# Patient Record
Sex: MALE | Race: WHITE | Employment: STUDENT | URBAN - METROPOLITAN AREA
[De-identification: names, ages, dates, MRNs, and addresses within clinical notes are randomized per-mention and may not be internally consistent; named-entity substitution may affect disease eponyms.]

---

## 2018-01-19 ENCOUNTER — TRANSFERRED RECORDS (OUTPATIENT)
Dept: HEALTH INFORMATION MANAGEMENT | Facility: CLINIC | Age: 33
End: 2018-01-19

## 2019-12-24 ENCOUNTER — HOSPITAL ENCOUNTER (EMERGENCY)
Facility: CLINIC | Age: 34
Discharge: HOME | End: 2019-12-24
Attending: EMERGENCY MEDICINE | Admitting: EMERGENCY MEDICINE
Payer: COMMERCIAL

## 2019-12-24 VITALS
TEMPERATURE: 98.1 F | DIASTOLIC BLOOD PRESSURE: 71 MMHG | OXYGEN SATURATION: 100 % | BODY MASS INDEX: 29.05 KG/M2 | SYSTOLIC BLOOD PRESSURE: 115 MMHG | HEART RATE: 84 BPM | WEIGHT: 180 LBS | RESPIRATION RATE: 16 BRPM

## 2019-12-24 DIAGNOSIS — J06.9 VIRAL UPPER RESPIRATORY INFECTION: ICD-10-CM

## 2019-12-24 DIAGNOSIS — R05.9 COUGH: ICD-10-CM

## 2019-12-24 LAB
DEPRECATED S PYO AG THROAT QL EIA: NORMAL
FLUAV+FLUBV AG SPEC QL: NEGATIVE
FLUAV+FLUBV AG SPEC QL: NEGATIVE
SPECIMEN SOURCE: NORMAL
SPECIMEN SOURCE: NORMAL

## 2019-12-24 PROCEDURE — 99283 EMERGENCY DEPT VISIT LOW MDM: CPT | Performed by: EMERGENCY MEDICINE

## 2019-12-24 PROCEDURE — 87081 CULTURE SCREEN ONLY: CPT | Performed by: EMERGENCY MEDICINE

## 2019-12-24 PROCEDURE — 87880 STREP A ASSAY W/OPTIC: CPT | Performed by: EMERGENCY MEDICINE

## 2019-12-24 PROCEDURE — 99282 EMERGENCY DEPT VISIT SF MDM: CPT | Mod: Z6 | Performed by: EMERGENCY MEDICINE

## 2019-12-24 PROCEDURE — 87804 INFLUENZA ASSAY W/OPTIC: CPT | Performed by: EMERGENCY MEDICINE

## 2019-12-24 ASSESSMENT — ENCOUNTER SYMPTOMS
SINUS PRESSURE: 0
BLOOD IN STOOL: 0
RHINORRHEA: 1
FEVER: 0
PSYCHIATRIC NEGATIVE: 1
SHORTNESS OF BREATH: 0
ABDOMINAL PAIN: 0
DIZZINESS: 0
FATIGUE: 0
ACTIVITY CHANGE: 0
DYSURIA: 0
ARTHRALGIAS: 0
CONSTIPATION: 0
WOUND: 0
HEMATURIA: 0
SINUS PAIN: 0
CHILLS: 0
DIARRHEA: 0
BACK PAIN: 0
PALPITATIONS: 0
NECK STIFFNESS: 0
NAUSEA: 0
APPETITE CHANGE: 0
SORE THROAT: 0
ANAL BLEEDING: 0
LIGHT-HEADEDNESS: 1
VOMITING: 0
DIAPHORESIS: 0
CHEST TIGHTNESS: 0
EYE REDNESS: 0
MYALGIAS: 0
STRIDOR: 0
ABDOMINAL DISTENTION: 0
FREQUENCY: 0
EYE PAIN: 0

## 2019-12-24 NOTE — DISCHARGE INSTRUCTIONS
Thank you for choosing [facility].     Please closely monitor for further symptoms. Return to the Emergency Department if you develop any new or worsening signs or symptoms.    Your rapid influenza A/B test was negative. Your rapid Strep test for strep throat was negative.    You most likely have an upper respiratory infection caused by a virus. You do not need antibiotics at this time. Please see your primary care physician of cough does not get better in 7-10 days.     You may take Cepacol throat lozenges for sore throat related to cough, dextromethorphan for cough, and Mucinex or other over the counter cold medications for symptoms.     Labs, cultures or final xray interpretations may still need to be reviewed.  We will call you if your plan of care needs to be changed.    Please follow up with your primary care physician or clinic.

## 2019-12-24 NOTE — ED PROVIDER NOTES
History     Chief Complaint   Patient presents with     Cough     sore throat and cough for past 5 days, symptoms are getting worse, notes green phlegm. Taking actidil with minimal relief.     HPI  Anuj Tam is a 34 year old male with no significant PMH who presents with 5 days of cough and congestion. He reports he had a cold a few weeks ago and went to [facility], received Aprodine, which completely resolved his symptoms.  Starting on Thursday (5 days ago), patient noted cough and runny nose. Symptoms have been persistent and becoming more frequent. Patient notes he spent time with someone on Thursday who had congestion.  Cough occasionally brings up greenish phlegm. No headache, no fever/chills, no shortness of breath, no vomiting, no diarrhea, no urinary frequency/dysuria/hematuria, no hematochezia, no myalgias/arthralgias. Is eating and drinking normally. Notes some sort throat associated with coughing. No difficulty swallowing.    Did not receive influenza vaccine this year.    Patient states he is otherwise healthy. Current PhD student in economics. Only medication is sertraline.     I have reviewed the Medications, Allergies, Past Medical and Surgical History, and Social History in the Epic system.    Review of Systems   Constitutional: Negative for activity change, appetite change, chills, diaphoresis, fatigue and fever.   HENT: Positive for congestion, postnasal drip and rhinorrhea. Negative for ear discharge, hearing loss, mouth sores, nosebleeds, sinus pressure, sinus pain and sore throat.    Eyes: Negative for pain and redness.   Respiratory: Negative for chest tightness, shortness of breath and stridor.    Cardiovascular: Negative for chest pain, palpitations and leg swelling.   Gastrointestinal: Negative for abdominal distention, abdominal pain, anal bleeding, blood in stool, constipation, diarrhea, nausea and vomiting.   Genitourinary: Negative for dysuria, frequency and hematuria.    Musculoskeletal: Negative for arthralgias, back pain, myalgias and neck stiffness.   Skin: Negative for rash and wound.   Neurological: Positive for light-headedness. Negative for dizziness and syncope.   Psychiatric/Behavioral: Negative.        Physical Exam   BP: 115/71  Pulse: 84  Temp: 98.1  F (36.7  C)  Resp: 16  Weight: 81.6 kg (180 lb)  SpO2: 100 %      Physical Exam  Constitutional:       Appearance: Normal appearance.   HENT:      Head: Atraumatic.      Right Ear: Tympanic membrane, ear canal and external ear normal.      Left Ear: Tympanic membrane, ear canal and external ear normal.      Nose: Congestion and rhinorrhea present.      Mouth/Throat:      Mouth: Mucous membranes are moist.      Pharynx: Oropharynx is clear. No oropharyngeal exudate or posterior oropharyngeal erythema.   Eyes:      Conjunctiva/sclera: Conjunctivae normal.      Pupils: Pupils are equal, round, and reactive to light.   Neck:      Musculoskeletal: Normal range of motion and neck supple.   Cardiovascular:      Rate and Rhythm: Normal rate and regular rhythm.      Pulses: Normal pulses.   Pulmonary:      Effort: Pulmonary effort is normal. No respiratory distress.      Breath sounds: No stridor. No wheezing, rhonchi or rales.   Abdominal:      General: Abdomen is flat. Bowel sounds are normal. There is no distension.      Palpations: Abdomen is soft.      Tenderness: There is no guarding or rebound.   Musculoskeletal: Normal range of motion.   Skin:     General: Skin is warm and dry.      Coloration: Skin is not jaundiced or pale.      Findings: No rash.   Neurological:      General: No focal deficit present.      Mental Status: He is alert.   Psychiatric:         Mood and Affect: Mood normal.         ED Course         Results for orders placed or performed during the hospital encounter of 12/24/19   Rapid strep screen     Status: None   Result Value Ref Range    Specimen Description Throat     Rapid Strep A Screen        NEGATIVE: No Group A streptococcal antigen detected by immunoassay, await culture report.   Influenza A/B antigen     Status: None   Result Value Ref Range    Influenza A/B Agn Specimen Nasopharyngeal     Influenza A Negative NEG^Negative    Influenza B Negative NEG^Negative            Assessments & Plan (with Medical Decision Making)   Yonatan Leslie is a healthy 34 year old presenting with 5 days of congestion and productive cough. He has no other symptoms. He had a sick contact with the same symptoms prior to developing them. On exam he appears well, with normal vitals. Pulmonary exam notable for no wheezing, rales or signs of consolidation, and patient satting 100% on room air. Low suspicion for influenza given no fever or myalgias and rapid strep A/B swab is negative. Low suspicion for strep throat given no sore throat and presence of cough and rapid strep swab is negative. Likely has viral upper respiratory infection given symptoms and time course. There is no indication for antibiotics at this time. Patient instructed to seek medical care or follow up with primary doctor if cough does not improve in the next 7-10 days or if he develops fever, severe headache, sinus pressure that does not go away. Patient discharged to home with instructions to take over the counter cold medications.     I have reviewed the nursing notes.    I have reviewed the findings, diagnosis, plan and need for follow up with the patient.    New Prescriptions    No medications on file       Final diagnoses:   Viral upper respiratory infection   Cough       12/24/2019   [facility], [place], EMERGENCY DEPARTMENT     Shelby Freed MD  Resident  12/24/19 8458

## 2019-12-26 LAB
BACTERIA SPEC CULT: NORMAL
Lab: NORMAL
SPECIMEN SOURCE: NORMAL

## 2019-12-26 NOTE — RESULT ENCOUNTER NOTE
Final Beta strep group A r/o culture is NEGATIVE for Group A streptococcus.    No treatment or change in treatment per [facility] Strep protocol.

## 2019-12-26 NOTE — ED PROVIDER NOTES
This data collected with the Resident working in the Emergency Department.  Patient was seen and evaluated by myself and I repeated the history and physical exam with the patient.  The plan of care was discussed with them.  The key portions of the note including the entire assessment and plan reflect my documentation.           Edgar Pace, DO  12/26/19 1600

## 2020-03-10 ENCOUNTER — HEALTH MAINTENANCE LETTER (OUTPATIENT)
Age: 35
End: 2020-03-10

## 2020-07-07 ENCOUNTER — TRANSFERRED RECORDS (OUTPATIENT)
Dept: HEALTH INFORMATION MANAGEMENT | Facility: CLINIC | Age: 35
End: 2020-07-07

## 2020-07-07 ENCOUNTER — MEDICAL CORRESPONDENCE (OUTPATIENT)
Dept: HEALTH INFORMATION MANAGEMENT | Facility: CLINIC | Age: 35
End: 2020-07-07

## 2020-07-22 VITALS — WEIGHT: 168 LBS | BODY MASS INDEX: 27 KG/M2 | HEIGHT: 66 IN

## 2020-07-22 RX ORDER — SERTRALINE HYDROCHLORIDE 100 MG/1
25 TABLET, FILM COATED ORAL DAILY
COMMUNITY

## 2020-07-22 RX ORDER — FAMOTIDINE 20 MG
TABLET ORAL
COMMUNITY

## 2020-07-22 ASSESSMENT — MIFFLIN-ST. JEOR: SCORE: 1649.79

## 2020-07-22 NOTE — PROGRESS NOTES
Referral Information     Referral #  Creation Date  Referral Status  Status Update     [id number]  07/15/2020  Authorized  07/15/2020: Status History    Status Reason  Referral Type  Referral Reasons  Referral Class    No Approval Necessary - Patient Tracking  none  none  none    To Specialty  To Provider  To Location/Place of Service  To Department    Nurse Practitioner  Shelli Kraus APRN CNP  [facility]    To Vendor  Referred By  By Location/Place of Service  By Department    none  none  none  none    Priority  Start Date  Expiration Date  Referral Entered By    Routine  07/23/2020 07/23/2021  Janneth Chavez    Visits Requested  Visits Authorized  Visits Completed  Visits Scheduled    1  1   1    Procedure Information     Free Text Procedure Description     NEW REFERRED OUTSIDE [facility]             Diagnosis Information     Diagnosis Description    ref by Dr. Velazquez from [facility]-- advised pt of vitrual visit - send sleep packet    Referral Notes   Number of Notes: 1   Type  Date  User  Summary  Attachment    FSC  07/15/2020  8:55 AM  Janneth Chavez  NEW REFERRED OUTSIDE [facility]  -    Note     PB DOS:                                 7/23/2020  Payor:                                     [employer] [place]     FSC Clearance Note  Visit Description:                  NEW REFERRED OUTSIDE [facility]     Benefits Contact:                  JOAQUIN-Verified  Benefits Phone:                      N/A  Reference Number:                N/A     Assigned Clinic:                      No assigned [facility]  Referral Number:                    N/A  Referral Date Range:              N/A  Number of Visits:                    N/A     Procedures  Actigraphy:                              Non-Covered     Oximetry:                                 Non-Covered     HST/PSG:                               Auth Required  MSLT/MWT:                            Auth Required   WatchPat:    "                            Auth Required                     .  Anuj Tam is a 34 year old male who is being evaluated via a billable video visit.      The patient has been notified of following:     \"This video visit will be conducted via a call between you and your physician/provider. We have found that certain health care needs can be provided without the need for an in-person physical exam.  This service lets us provide the care you need with a video conversation.  If a prescription is necessary we can send it directly to your pharmacy.  If lab work is needed we can place an order for that and you can then stop by our lab to have the test done at a later time.    Video visits are billed at different rates depending on your insurance coverage.  Please reach out to your insurance provider with any questions.    If during the course of the call the physician/provider feels a video visit is not appropriate, you will not be charged for this service.\"    Patient has given verbal consent for Video visit? Yes  How would you like to obtain your AVS? MyChart  If you are dropped from the video visit, the video invite should be resent to: Text to cell phone: see demographics  Will anyone else be joining your video visit? No        Video-Visit Details    Type of service:  Video Visit    Video Start Time: 9:05 AM  Video End Time: ~10AM    Originating Location (pt. Location): Home    Distant Location (provider location):  [facility] [place]     Platform used for Video Visit: [facility]   Outpatient Sleep Medicine Consultation  7/23/2020        Name: Anuj Tam MRN# [id number]   Age:  34 YOB: 1985      Date of Consultation: 7/7/2020  Consultation is requested by: KAITLIN Lord         Reason for Sleep Consult:      Snoring, annoying bed partner, less restorative sleep when not in COVID times with earlier deadlines and more anxiety with sleep          Assessment and " Plan:    Christy reports that this year he has had a history of difficulty with worsening of snoring, and less restorative sleep prior to COVID with some worry and anxiety about deadlines with arousals from sleep.  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  And the following diagnosis and plan has been developed...    Summary Sleep Diagnoses:      Snoring : Loud, gasping arousals    Insomnia : Possible insomnia with worry occurring during times of sleep fragmentation due to snoring and apnea.  Impact of circadian rhythm as well is likely playing a part as well as sleep habits of using devices (has changed the light exposure on 1 of his devices) and doing his study work within the 1 hour prior to bedtime  Sleep habits: Alcohol prior to bedtime is likely adding to sleep fragmentation, worry and to do list behaviors are present with wake ups.  Wake ups fortunately are brief  Summary Recommendations:       HST with stop bang score of 4/8    Treatment therapies and final decision through my chart communication with virtual visit after results of testing.     Summary Counseling:  See instructions     Counseling included a comprehensive review of pathophysiology of possible obstructive sleep apnea, and delayed sleep phase with variance and rise time limiting total sleep time, impact of  alcohol and less downtime prior to lights out (and possible device light) likely impacting quality of nights rest.  Diagnostic and therapeutic strategies as well as risks of inadequate therapy or no treatment were discussed.      Cessation of sleep habits alcohol before bedtime, wind down time, use of bed for his PhD work, and possible light from some devices were brought up.  He does have the light changed on 1 of his devices to a black background   Anuj was provided with educational materials in instructions which were reviewed with this visit.                History of Present Illness:    referred with c/o's of loud snoring and gasping arousals several times/night    Phyllis reports loud snoring, gasping or snorting arousals (several times per night) and on occasion needing to sleep separately from bed partner    (snoing, , gasping-sort or snort, waking up with panic or inc in HR, sleeping separately, )      SLEEP COMPLAINTS: DENIES THE FOLLOWING: (unless indicated in RED):        Signs/symptoms:    Morning headaches or confusion--  GERD or aspiration:+  Pain -  Bruxism: +  Parasomnia:  sw  -: confusional arousals  - no act  Narcolepsy:  Cataplexy  -, sleep paralysis - , hallucinations  -  RLS: : -      SLEEP-WAKE SCHEDULE:   Enters bed on [id number], exits bed on weekdays 8-8:30; enters bed at weekends 12-1:30 2A, exits bed on weekends: 9:30-10A  15-30-40 minsSLEEP LATENCY   And 0- one awakening for bathroom with < 5mins to return to sleep  Naps-0-  Sleep schedule is irregular   Work schedule is later in day, anxious   Estimated total sleep time 7-8A  Activities in bed:  Tablet, or computer, phone  Needs 8 hrs, tired     Denies the following unless in RED:   Behaviors in bed:  clock watching - to do list +, worry about health or ability to complete next day's-weeks tasks +  Housework - employment -anxiety or rumination,+ before sleep, unable to estimate number of days/7waking up with heart pounding or  racing-               Cardio-respiratory    Coexisting Lung disease:  -,  infrequent       Coexisting Heart disease:  -         Social, personal, family History and use of sleep aids or substances that affect sleep:     yovanny lives with wife. He is a student phd study. Sleep is disrupted by environmental factors from noise,-pets,-, children-, bedpartner+  Has bed partner been sleeping separately because of snoring or:  , Yes, and also does ask him to change position with his snoring    Sleep Family Hx:         Snoring + mom        RLS- -   CAMILO - +  Insomnia -   Parasomnia -   Chemical History:      Tobacco: cigar     Uses 1 cups/day of coffee occasional in afternoon. Last caffeine intake is usually before  7pm    Supplements for wakefulness: -    EtOH:  Some Weeks 1 or 2 after 6 pm, could be later on weekend: 12:30 AM Wine    Recreational Drugs: - mj   Sleep aids: yes: took valarian root, melatonin  (drowsy in am)          Review of Systems:     A complete 10 point review of systems was negative other than HPI or as commented below:   Harinder has gained 5-10 pounds 3 years.      CONSTITUTIONAL: NEGATIVE for weight gain/loss, fever, chills, sweats or night sweats, drug allergies.  EYES: NEGATIVE for changes in vision, blind spots, double vision.  ENT: NEGATIVE for ear pain, sore throat, sinus pain, post-nasal drip, runny nose, bloody nose  CARDIAC: NEGATIVE for fast heartbeats or fluttering in chest, chest pain or pressure, breathlessness when lying flat, swollen legs or swollen feet-with walking alot  NEUROLOGIC: NEGATIVE headaches, weakness or numbness in the arms or legs.  DERMATOLOGIC: NEGATIVE for rashes, new moles or change in mole(s)  PULMONARY: NEGATIVE SOB at rest, SOB with activity ( 2fights w/o stopping), dry cough, productive cough, coughing up blood, wheezing or whistling when breathing.    GASTROINTESTINAL: NEGATIVE for nausea or vomitting, loose or watery stools, fat or grease in stools,  "constipation, abdominal pain, bowel movements black in color or blood noted.  GENITOURINARY: NEGATIVE for pain during urination, blood in urine, urinating more frequently than usual, irregular menstrual periods.  MUSCULOSKELETAL: NEGATIVE for muscle pain, bone or joint pain, swollen joints.  ENDOCRINE: NEGATIVE for increased thirst or urination, diabetes.  MH: depression/anxiety started sertrolyine            Physical Examination:     Allergies:    No Known Allergies    Medications:    Current Outpatient Medications   Medication Sig Dispense Refill     B Complex Vitamins (VITAMIN B COMPLEX PO)        finasteride (PROPECIA) 1 MG tablet Take 1 mg by mouth daily       sertraline (ZOLOFT) 100 MG tablet Take 100 mg by mouth daily       Vitamin D, Cholecalciferol, 25 MCG (1000 UT) CAPS          Problem List:  Patient Active Problem List    Diagnosis Date Noted     Dyspnea and respiratory abnormality 06/18/2015     Priority: Medium     Problem list name updated by automated process. 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  No past surgical history on file.      Physical Examination:  Vitals: Ht 1.676 m (5' 6\")   Wt 76.2 kg (168 lb)   BMI 27.12 kg/m    BMI= Body mass index is 27.12 kg/m .     [place] Total Score 7/22/2020   Total score - [place] 3     ELZBIETA scale 20/28    Neck Circumference: - inches/cm   Constitutional: . Awake, alert, cooperative, dressed casually, good eye contact, comfortably sitting in a chair, in no apparent distress  Mood: euthymic; affect congruent with full range and intensity.  Attention/Concentration:  Normal   Eyes: No icterus.  ENT: Mallampati Class: IV.   Tonsillar Stage: 1  hidden by pillars   crowded oropharynx,  low-lying soft palate  deviated nasal septum,  Dentition: good condition, one molar implant, good advancement of lower jaw without tmd  Pulmonary: Normal respiration " rate at rest   Extremities: Upper extremities without clubbing bilaterally .  Skin: Facial: No rash or significant lesions.   Neurologic: Alert, oriented x3, no focal neurological deficit,                   A

## 2020-07-22 NOTE — PROGRESS NOTES
"Anuj Tam is a 34 year old male who is being evaluated via a billable video visit.      The patient has been notified of following:     \"This video visit will be conducted via a call between you and your physician/provider. We have found that certain health care needs can be provided without the need for an in-person physical exam.  This service lets us provide the care you need with a video conversation.  If a prescription is necessary we can send it directly to your pharmacy.  If lab work is needed we can place an order for that and you can then stop by our lab to have the test done at a later time.    Video visits are billed at different rates depending on your insurance coverage.  Please reach out to your insurance provider with any questions.    If during the course of the call the physician/provider feels a video visit is not appropriate, you will not be charged for this service.\"    Patient has given verbal consent for Video visit? Yes  How would you like to obtain your AVS? MyChart  If you are dropped from the video visit, the video invite should be resent to: Send to e-mail at: [email or web address]  Will anyone else be joining your video visit? No  {If patient encounters technical issues they should call [phone] :[id number]}      Video-Visit Details    Type of service:  Video Visit    Video Start Time: {video visit start/end time:152948}  Video End Time: {video visit start/end time:152948}    Originating Location (pt. Location): {video visit patient location:467096::\"Home\"}    Distant Location (provider location):  [facility]     Platform used for Video Visit: {Virtual Visit Platforms:630276::\"Instant Opinion\"}    {signature options:344368}        "

## 2020-07-22 NOTE — PATIENT INSTRUCTIONS
"MY TREATMENT INFORMATION FOR SLEEP APNEA-  Anuj Tam    DOCTOR : [facility]  SLEEP CENTER :  [place]    MY CONTACT NUMBER: [phone]    It was great to meet you in clinic today.  Your sleep problem is somewhat complicated by possible sleep apnea, but is also impacted by your natural \"night owl\" pattern somewhat limited limiting her total sleep time and some habits that could improve your sleep quality.  It is my understanding that you wish to move forward with a sleep study.  As you do know with COVID-19 we are out a period of time in scheduling.  In the meantime,  I will give you some information regarding how to change some habits around sleep to improve sleep quality.  Please look to the bottom of this letter to see some of the information that likely is impacting your sleep quality at this time.  I am also going to recommend that you consider using a positional device (or developing one on your own) to improve your quality of sleep up until 1 more week before we would study you.  This must be stopped likely at least 1 week prior so we can get a good baseline we may do do your study.    Am I having a sleep study at a sleep center?  Make sure you have an appointment for the study before you leave!    Am I having a home sleep study?  Watch this video:  /drop off device-   [email or web address]  We will be calling you in about 2 weeks time to schedule this study.    Please verify your insurance coverage with your insurance carrier    Frequently asked questions: About sleep apnea  1. What is Obstructive Sleep Apnea (CAMILO)? CAMILO is the most common type of sleep apnea. Apnea means, \"without breath.\"  Apnea is most often caused by narrowing or collapse of the upper airway as muscles relax during sleep.   Almost everyone has occasional apneas. Most people with sleep apnea have had brief interruptions at night frequently for many years.  The severity of sleep apnea " is related to how frequent and severe the events are.   2. What are the consequences of CAMILO? Symptoms include: feeling sleepy during the day, snoring loudly, gasping or stopping of breathing, trouble sleeping, and occasionally morning headaches or heartburn at night.  Sleepiness can be serious and even increase the risk of falling asleep while driving. Other health consequences may include development of high blood pressure and other cardiovascular disease in persons who are susceptible. Untreated CAMILO  can contribute to heart disease, stroke and diabetes.   3. What are the treatment options? In most situations, sleep apnea is a lifelong disease that must be managed with daily therapy. Medications are not effective for sleep apnea and surgery is generally not considered until other therapies have been tried. Your treatment is your choice . Continuous Positive Airway (CPAP) works right away and is the therapy that is effective in nearly everyone. An oral device to hold your jaw forward is usually the next most reliable option. Other options include postioning devices (to keep you off your back), weight loss, and surgery including a tongue pacing device. There is more detail about some of these options below.    Important tips for using CPAP and similar devices   Know your equipment:  CPAP is continuous positive airway pressure that prevents obstructive sleep apnea by keeping the throat from collapsing while you are sleeping. In most cases, the device is  smart  and can slowly self-adjusts if your throat collapses and keeps a record every day of how well you are treated-this information is available to you and your care team.  BPAP is bilevel positive airway pressure that keeps your throat open and also assists each breath with a pressure boost to maintain adequate breathing.  Special kinds of BPAP are used in patients who have inadequate breathing from lung or heart disease. In most cases, the device is  smart  and  can slowly self-adjusts to assist breathing. Like CPAP, the device keeps a record of how well you are treated.  Your mask is your connection to the device. You get to choose what feels most comfortable and the staff will help to make sure if fits. Here: are some examples of the different masks that are available:       Key points to remember on your journey with sleep apnea:  1. Sleep study.  PAP devices often need to be adjusted during a sleep study to show that they are effective and adjusted right.  2. Good tips to remember: Try wearing just the mask during a quiet time during the day so your body adapts to wearing it. A humidifier is recommended for comfort in most cases to prevent drying of your nose and throat. Allergy medication from your provider may help you if you are having nasal congestion.  3. Getting settled-in. It takes more than one night for most of us to get used to wearing a mask. Try wearing just the mask during a quiet time during the day so your body adapts to wearing it. A humidifier is recommended for comfort in most cases. Our team will work with you carefully on the first day and will be in contact within 4 days and again at 2 and 4 weeks for advice and remote device adjustments. Your therapy is evaluated by the device each day.   4. Use it every night. The more you are able to sleep naturally for 7-8 hours, the more likely you will have good sleep and to prevent health risks or symptoms from sleep apnea. Even if you use it 4 hours it helps. Occasionally all of us are unable to use a medical therapy, in sleep apnea, it is not dangerous to miss one night.   5. Communicate. Call our skilled team on the number provided on the first day if your visit for problems that make it difficult to wear the device. Over 2 out of 3 patients can learn to wear the device long-term with help from our team. Remember to call our team or your sleep providers if you are unable to wear the device as we may have  other solutions for those who cannot adapt to mask CPAP therapy. It is recommended that you sleep your sleep provider within the first 3 months and yearly after that if you are not having problems.   6. Use it for your health. We encourage use of CPAP masks during daytime quiet periods to allow your face and brain to adapt to the sensation of CPAP so that it will be a more natural sensation to awaken to at night or during naps. This can be very useful during the first few weeks or months of adapting to CPAP though it does not help medically to wear CPAP during wakefulness and  should not be used as a strategy just to meet guidelines.  7. Take care of your equipment. Make sure you clean your mask and tubing using directions every day and that your filter and mask are replaced as recommended or if they are not working.     BESIDES CPAP, WHAT OTHER THERAPIES ARE THERE?    Positioning Device-I am recommending that you consider using this to see if your sleep improves prior to your sleep study.  You must stop using this for 1 week prior to your study.  Positioning devices are generally used when sleep apnea is mild and only occurs on your back.This example shows a pillow that straps around the waist. It may be appropriate for those whose sleep study shows milder sleep apnea that occurs primarily when lying flat on one's back. Preliminary studies have shown benefit but effectiveness at home may need to be verified by a home sleep test. These devices are generally not covered by medical insurance.  Examples of devices that maintain sleeping on the back to prevent snoring and mild sleep apnea.  (You may want to consider something less expensive to see if this will help train you to roll from left side to right over the belly rather than over the back.  If people roll from side to side over the back, they often will stop on the back of sleep prior period off time with loud snoring and apnea)         LESS EXPENSIVE:  [employer]   or [employer] for slumber bump pillow, or make your own with backpack w/ chest strap stuffed w/ pillow;  or t shirt with /2 pockets, sew in tennis balls    Belt type body positioner  [email or web address]    Electronic reminder  Http://nightshifttherapy.[email or web address]  [email or web address]      Oral Appliance  What is oral appliance therapy?  An oral appliance device fits on your teeth at night like a retainer used after having braces. The device is made by a specialized dentist and requires several visits over 1-2 months before a manufactured device is made to fit your teeth and is adjusted to prevent your sleep apnea. Once an oral device is working properly, snoring should be improved. A home sleep test may be recommended at that time if to determine whether the sleep apnea is adequately treated.       Some things to remember:  -Oral devices are often, but not always, covered by your medical insurance. Be sure to check with your insurance provider.   -If you are referred for oral therapy, you will be given a list of specialized dentists to consider or you may choose to visit the Web site of the American Academy of Dental Sleep Medicine  -Oral devices are less likely to work if you have severe sleep apnea or are extremely overweight.     More detailed information  An oral appliance is a small acrylic device that fits over the upper and lower teeth  (similar to a retainer or a mouth guard). This device slightly moves jaw forward, which moves the base of the tongue forward, opens the airway, improves breathing for effective treat snoring and obstructive sleep apnea in perhaps 7 out of 10 people .  The best working devices are custom-made by a dental device  after a mold is made of the teeth 1, 2, 3.  When is an oral appliance indicated?  Oral appliance therapy is recommended as a first-line treatment for patients with primary snoring, mild sleep apnea, and for patients with moderate sleep apnea who prefer appliance  therapy to use of CPAP4, 5. Severity of sleep apnea is determined by sleep testing and is based on the number of respiratory events per hour of sleep.   How successful is oral appliance therapy?  The success rate of oral appliance therapy in patients with mild sleep apnea is 75-80% while in patients with moderate sleep apnea it is 50-70%. The chance of success in patients with severe sleep apnea is 40-50%. The research also shows that oral appliances have a beneficial effect on the cardiovascular health of CAMILO patients at the same magnitude as CPAP therapy7.  Oral appliances should be a second-line treatment in cases of severe sleep apnea, but if not completely successful then a combination therapy utilizing CPAP plus oral appliance therapy may be effective. Oral appliances tend to be effective in a broad range of patients although studies show that the patients who have the highest success are females, younger patients, those with milder disease, and less severe obesity. 3, 6.   Finding a dentist that practices dental sleep medicine  Specific training is available through the American Academy of Dental Sleep Medicine for dentists interested in working in the field of sleep. To find a dentist who is educated in the field of sleep and the use of oral appliances, near you, visit the Web site of the American Academy of Dental Sleep Medicine.    References  1. Mino et al. Objectively measured vs self-reported compliance during oral appliance therapy for sleep-disordered breathing. Chest 2013; 144(5): 2472-8825.  2. Margarita et al. Objective measurement of compliance during oral appliance therapy for sleep-disordered breathing. Thorax 2013; 68(1): 91-96.  3. Nicko et al. Mandibular advancement devices in 620 men and women with CAMILO and snoring: tolerability and predictors of treatment success. Chest 2004; 125: 9156-6593.  4. Lino, et al. Oral appliances for snoring and CAMILO: a review. Sleep 2006; 29:  440-493.  5. jt Marquez al. Oral appliance treatment for CAMILO: an update. J Clin Sleep Med 2014; 10(2): 215-227.  6. Valencia et al. Predictors of OSAH treatment outcome. J Dent Res 2007; 86: 4251-5009.      Weight Loss: (This is likely not playing a big impact in your case)    Weight loss is a long-term strategy that may improve sleep apnea in some patients.    Weight management is a personal decision and the decision should be based on your interest and the potential benefits.  If you are interested in exploring weight loss strategies, the following discussion covers the impact on weight loss on sleep apnea and the approaches that may be successful.    Being overweight does not necessarily mean you will have health consequences.  Those who have BMI over 35 or over 27 with existing medical conditions carries greater risk.   Weight loss decreases severity of sleep apnea in most people with obesity. For those with mild obesity who have developed snoring with weight gain, even 15-30 pound weight loss can improve and occasionally eliminate sleep apnea.  Structured and life-long dietary and health habits are necessary to lose weight and keep healthier weight levels.     Though there may be significant health benefits from weight loss, long-term weight loss is very difficult to achieve- studies show success with dietary management in less than 10% of people. In addition, substantial weight loss may require years of dietary control and may be difficult if patients have severe obesity. In these cases, surgical management may be considered.  Finally, older individuals who have tolerated obesity without health complications may be less likely to benefit from weight loss strategies.      [unfilled]    Surgery: (Less effective)    Surgery for obstructive sleep apnea is considered generally only when other therapies fail to work. Surgery may be discussed with you if you are having a difficult time tolerating CPAP and or when  there is an abnormal structure that requires surgical correction.  Nose and throat surgeries often enlarge the airway to prevent collapse.  Most of these surgeries create pain for 1-2 weeks and up to half of the most common surgeries are not effective throughout life.  You should carefully discuss the benefits and drawbacks to surgery with your sleep provider and surgeon to determine if it is the best solution for you.   More information  Surgery for CAMILO is directed at areas that are responsible for narrowing or complete obstruction of the airway during sleep.  There are a wide range of procedures available to enlarge and/or stabilize the airway to prevent blockage of breathing in the three major areas where it can occur: the palate, tongue, and nasal regions.  Successful surgical treatment depends on the accurate identification of the factors responsible for obstructive sleep apnea in each person.  A personalized approach is required because there is no single treatment that works well for everyone.  Because of anatomic variation, consultation with an examination by a sleep surgeon is a critical first step in determining what surgical options are best for each patient.  In some cases, examination during sedation may be recommended in order to guide the selection of procedures.  Patients will be counseled about risks and benefits as well as the typical recovery course after surgery. Surgery is typically not a cure for a person s CAMILO.  However, surgery will often significantly improve one s CAMILO severity (termed  success rate ).  Even in the absence of a cure, surgery will decrease the cardiovascular risk associated with OSA7; improve overall quality of life8 (sleepiness, functionality, sleep quality, etc).      Palate Procedures:  Patients with CAMILO often have narrowing of their airway in the region of their tonsils and uvula.  The goals of palate procedures are to widen the airway in this region as well as to help  the tissues resist collapse.  Modern palate procedure techniques focus on tissue conservation and soft tissue rearrangement, rather than tissue removal.  Often the uvula is preserved in this procedure. Residual sleep apnea is common in patient after pharyngoplasty with an average reduction in sleep apnea events of 33%2.      Tongue Procedures:  ExamWhile patients are awake, the muscles that surround the throat are active and keep this region open for breathing. These muscles relax during sleep, allowing the tongue and other structures to collapse and block breathing.  There are several different tongue procedures available.  Selection of a tongue base procedure depends on characteristics seen on physical exam.  Generally, procedures are aimed at removing bulky tissues in this area or preventing the back of the tongue from falling back during sleep.  Success rates for tongue surgery range from 50-62%3.    Hypoglossal Nerve Stimulation:  Hypoglossal nerve stimulation has recently received approval from the [place] Food and Drug Administration for the treatment of obstructive sleep apnea.  This is based on research showing that the system was safe and effective in treating sleep apnea6.  Results showed that the median AHI score decreased 68%, from 29.3 to 9.0. This therapy uses an implant system that senses breathing patterns and delivers mild stimulation to airway muscles, which keeps the airway open during sleep.  The system consists of three fully implanted components: a small generator (similar in size to a pacemaker), a breathing sensor, and a stimulation lead.  Using a small handheld remote, a patient turns the therapy on before bed and off upon awakening.    Candidates for this device must be greater than 22 years of age, have moderate to severe CAMILO (AHI between 20-65), BMI less than 32, have tried CPAP/oral appliance without success, and have appropriate upper airway anatomy (determined by a sleep  endoscopy performed by Dr. Owen).    Hypoglossal Nerve Stimulation Pathway:    The sleep surgeon s office will work with the patient through the insurance prior-authorization process (including communications and appeals).    Nasal Procedures:  Nasal obstruction can interfere with nasal breathing during the day and night.  Studies have shown that relief of nasal obstruction can improve the ability of some patients to tolerate positive airway pressure therapy for obstructive sleep apnea1.  Treatment options include medications such as nasal saline, topical corticosteroid and antihistamine sprays, and oral medications such as antihistamines or decongestants. Non-surgical treatments can include external nasal dilators for selected patients. If these are not successful by themselves, surgery can improve the nasal airway either alone or in combination with these other options.      Combination Procedures:  Combination of surgical procedures and other treatments may be recommended, particularly if patients have more than one area of narrowing or persistent positional disease.  The success rate of combination surgery ranges from 66-80%2,3.    References  1. Sd RAMIREZ. The Role of the Nose in Snoring and Obstructive Sleep Apnoea: An Update.  Eur Arch Otorhinolaryngol. 2011; 268: 1365-73.  2.  Yani SM; Brianne JA; Gato JR; Pallanch JF; Leobardo MB; Cruz SG; Bryan FUCHS. Surgical modifications of the upper airway for obstructive sleep apnea in adults: a systematic review and meta-analysis. SLEEP 2010;33(10):5865-5201. Chloe BOYKIN. Hypopharyngeal surgery in obstructive sleep apnea: an evidence-based medicine review.  Arch Otolaryngol Head Neck Surg. 2006 Feb;132(2):206-13.  3. Lamberto YH1, Haris Y, Gama AL. The efficacy of anatomically based multilevel surgery for obstructive sleep apnea. Otolaryngol Head Neck Surg. 2003 Oct;129(4):327-35.  4. Chloe BOYKIN, Goldberg A. Hypopharyngeal Surgery in Obstructive Sleep Apnea: An  Evidence-Based Medicine Review. Arch Otolaryngol Head Neck Surg. 2006 Feb;132(2):206-13.  5. Dian PJ et al. Upper-Airway Stimulation for Obstructive Sleep Apnea.  N Engl J Med. 2014 Jan 9;370(2):139-49.  6. Jitendra Y et al. Increased Incidence of Cardiovascular Disease in Middle-aged Men with Obstructive Sleep Apnea. Am J Respir Crit Care Med; 2002 166: 159-165  Juan M CALI et al. Studying Life Effects and Effectiveness of Palatopharyngoplasty (SLEEP) study: Subjective Outcomes of Isolated Uvulopalatopharyngoplasty. Otolaryngol Head Neck Surg. 2011; 144: 623-631.      LIFESTYLE CHANGES FOR IMPROVING SLEEP  Avoid alcohol within 3-4 hrs of bedtime    Entered bed when sleepy.  Unwind for approximately 1 hour prior to bedtime doing activities that are unrelated to schoolwork or are not stressful.   This may make you less anxious at night when you do have wake ups and improve your quality of sleep.    If there are devices that you use that do a emit bright light (tablets, phones, computers) please stop using these and that 1 hour wind down time.    Keep the time that you wake up throughout the week to have less variance than approximately 2 hours.  This will help with sleep initiation on some nights when it takes 40 minutes to fall asleep.

## 2020-07-23 ENCOUNTER — VIRTUAL VISIT (OUTPATIENT)
Dept: SLEEP MEDICINE | Facility: CLINIC | Age: 35
End: 2020-07-23
Payer: COMMERCIAL

## 2020-07-23 DIAGNOSIS — R06.83 SNORING: Primary | ICD-10-CM

## 2020-07-23 DIAGNOSIS — G47.21 CIRCADIAN RHYTHM SLEEP DISORDER, DELAYED SLEEP PHASE TYPE: ICD-10-CM

## 2020-07-23 DIAGNOSIS — G47.8 UNHEALTHY SLEEP HABIT: ICD-10-CM

## 2020-07-23 PROCEDURE — 99204 OFFICE O/P NEW MOD 45 MIN: CPT | Mod: 95 | Performed by: NURSE PRACTITIONER

## 2020-07-23 NOTE — LETTER
7/23/2020        RE: Anuj Tam  [address]  [facility] [address]        Referral Information     Referral #  Creation Date  Referral Status  Status Update     [id number]  07/15/2020  Authorized  07/15/2020: Status History    Status Reason  Referral Type  Referral Reasons  Referral Class    No Approval Necessary - Patient Tracking  none  none  none    To Specialty  To Provider  To Location/Place of Service  To Department    Nurse Practitioner  Shelli Kraus APRN CNP  [facility]    To Vendor  Referred By  By Location/Place of Service  By Department    none  none  none  none    Priority  Start Date  Expiration Date  Referral Entered By    Routine  07/23/2020 07/23/2021  Janneth Chavez    Visits Requested  Visits Authorized  Visits Completed  Visits Scheduled    1  1   1    Procedure Information     Free Text Procedure Description     NEW REFERRED OUTSIDE [place]             Diagnosis Information     Diagnosis Description    ref by Dr. Velazquez from [facility]-- advised pt of vitrual visit - send sleep packet    Referral Notes   Number of Notes: 1   Type  Date  User  Summary  Attachment    FSC  07/15/2020  8:55 AM  Janneth Chavez  NEW REFERRED OUTSIDE [place]  -    Note     PB DOS:                                 7/23/2020  Payor:                                     [employer] [place]     FSC Clearance Note  Visit Description:                  NEW REFERRED OUTSIDE [place]     Benefits Contact:                  JOAQUIN-Verified  Benefits Phone:                      N/A  Reference Number:                N/A     Assigned Clinic:                      No assigned [facility]  Referral Number:                    N/A  Referral Date Range:              N/A  Number of Visits:                    N/A     Procedures  Actigraphy:                              Non-Covered     Oximetry:                                 Non-Covered     HST/PSG:                "                Auth Required  MSLT/MWT:                            Auth Required   WatchPat:                               Auth Required                     .  Anuj Tam is a 34 year old male who is being evaluated via a billable video visit.      The patient has been notified of following:     \"This video visit will be conducted via a call between you and your physician/provider. We have found that certain health care needs can be provided without the need for an in-person physical exam.  This service lets us provide the care you need with a video conversation.  If a prescription is necessary we can send it directly to your pharmacy.  If lab work is needed we can place an order for that and you can then stop by our lab to have the test done at a later time.    Video visits are billed at different rates depending on your insurance coverage.  Please reach out to your insurance provider with any questions.    If during the course of the call the physician/provider feels a video visit is not appropriate, you will not be charged for this service.\"    Patient has given verbal consent for Video visit? Yes  How would you like to obtain your AVS? MyChart  If you are dropped from the video visit, the video invite should be resent to: Text to cell phone: see demographics  Will anyone else be joining your video visit? No        Video-Visit Details    Type of service:  Video Visit    Video Start Time: 9:05 AM  Video End Time: ~10AM    Originating Location (pt. Location): Home    Distant Location (provider location):  [facility]     Platform used for Video Visit: [facility]   Outpatient Sleep Medicine Consultation  7/23/2020        Name: Anuj Tam MRN# [id number]   Age:  34 YOB: 1985      Date of Consultation: 7/7/2020  Consultation is requested by: KAITLIN Lord         Reason for Sleep Consult:      Snoring, annoying bed partner, less restorative sleep " when not in COVID times with earlier deadlines and more anxiety with sleep          Assessment and Plan:    Christy reports that this year he has had a history of difficulty with worsening of snoring, and less restorative sleep prior to COVID with some worry and anxiety about deadlines with arousals from sleep.  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  And the following diagnosis and plan has been developed...    Summary Sleep Diagnoses:      Snoring : Loud, gasping arousals    Insomnia : Possible insomnia with worry occurring during times of sleep fragmentation due to snoring and apnea.  Impact of circadian rhythm as well is likely playing a part as well as sleep habits of using devices (has changed the light exposure on 1 of his devices) and doing his study work within the 1 hour prior to bedtime  Sleep habits: Alcohol prior to bedtime is likely adding to sleep fragmentation, worry and to do list behaviors are present with wake ups.  Wake ups fortunately are brief  Summary Recommendations:       HST with stop bang score of 4/8    Treatment therapies and final decision through my chart communication with virtual visit after results of testing.     Summary Counseling:  See instructions     Counseling included a comprehensive review of pathophysiology of possible obstructive sleep  apnea, and delayed sleep phase with variance and rise time limiting total sleep time, impact of alcohol and less downtime prior to lights out (and possible device light) likely impacting quality of nights rest.  Diagnostic and therapeutic strategies as well as risks of inadequate therapy or no treatment were discussed.      Cessation of sleep habits alcohol before bedtime, wind down time, use of bed for his PhD work, and possible light from some devices were brought up.  He does have the light changed on 1 of his devices to a black background   Anuj was provided with educational materials in instructions which were reviewed with this visit.                History of Present Illness:    referred with c/o's of loud snoring and gasping arousals several times/night    Phyllis reports loud snoring, gasping or snorting arousals (several times per night) and on occasion needing to sleep separately from bed partner    (snoing, , gasping-sort or snort, waking up with panic or inc in HR, sleeping separately, )      SLEEP COMPLAINTS: DENIES THE FOLLOWING: (unless indicated in RED):        Signs/symptoms:    Morning headaches or confusion--  GERD or aspiration:+  Pain -  Bruxism: +  Parasomnia:  sw  -: confusional arousals  - no act  Narcolepsy:  Cataplexy  -, sleep paralysis - , hallucinations  -  RLS: : -      SLEEP-WAKE SCHEDULE:   Enters bed on [id number], exits bed on weekdays 8-8:30; enters bed at weekends 12-1:30 2A, exits bed on weekends: 9:30-10A  15-30-40 minsSLEEP LATENCY   And 0- one awakening for bathroom with < 5mins to return to sleep  Naps-0-  Sleep schedule is irregular   Work schedule is later in day, anxious   Estimated total sleep time 7-8A  Activities in bed:  Tablet, or computer, phone  Needs 8 hrs, tired     Denies the following unless in RED:   Behaviors in bed:  clock watching - to do list +, worry about health or ability to complete next day's-weeks tasks +  Housework - employment -anxiety or  rumination,+ before sleep, unable to estimate number of days/7waking up with heart pounding or racing-               Cardio-respiratory    Coexisting Lung disease:  -,  infrequent       Coexisting Heart disease:  -         Social, personal, family History and use of sleep aids or substances that affect sleep:     yovanny lives with wife. He is a student phd study. Sleep is disrupted by environmental factors from noise,-pets,-, children-, bedpartner+  Has bed partner been sleeping separately because of snoring or:  , Yes, and also does ask him to change position with his snoring    Sleep Family Hx:         Snoring + mom        RLS- -   CAMILO - +  Insomnia -   Parasomnia -   Chemical History:      Tobacco: cigar     Uses 1 cups/day of coffee occasional in afternoon. Last caffeine intake is usually before  7pm    Supplements for wakefulness: -    EtOH:  Some Weeks 1 or 2 after 6 pm, could be later on weekend: 12:30 AM Wine    Recreational Drugs: - mj   Sleep aids: yes: took valarian root, melatonin  (drowsy in am)          Review of Systems:     A complete 10 point review of systems was negative other than HPI or as commented below:   Harinder has gained 5-10 pounds 3 years.      CONSTITUTIONAL: NEGATIVE for weight gain/loss, fever, chills, sweats or night sweats, drug allergies.  EYES: NEGATIVE for changes in vision, blind spots, double vision.  ENT: NEGATIVE for ear pain, sore throat, sinus pain, post-nasal drip, runny nose, bloody nose  CARDIAC: NEGATIVE for fast heartbeats or fluttering in chest, chest pain or pressure, breathlessness when lying flat, swollen legs or swollen feet-with walking alot  NEUROLOGIC: NEGATIVE headaches, weakness or numbness in the arms or legs.  DERMATOLOGIC: NEGATIVE for rashes, new moles or change in mole(s)  PULMONARY: NEGATIVE SOB at rest, SOB with activity ( 2fights w/o stopping), dry cough, productive cough, coughing up blood, wheezing or whistling when breathing.    GASTROINTESTINAL:  "NEGATIVE for nausea or vomitting, loose or watery stools, fat or grease in stools, constipation, abdominal pain, bowel movements black in color or blood noted.  GENITOURINARY: NEGATIVE for pain during urination, blood in urine, urinating more frequently than usual, irregular menstrual periods.  MUSCULOSKELETAL: NEGATIVE for muscle pain, bone or joint pain, swollen joints.  ENDOCRINE: NEGATIVE for increased thirst or urination, diabetes.  MH: depression/anxiety started sertrolyine            Physical Examination:     Allergies:    No Known Allergies    Medications:    Current Outpatient Medications   Medication Sig Dispense Refill     B Complex Vitamins (VITAMIN B COMPLEX PO)        finasteride (PROPECIA) 1 MG tablet Take 1 mg by mouth daily       sertraline (ZOLOFT) 100 MG tablet Take 100 mg by mouth daily       Vitamin D, Cholecalciferol, 25 MCG (1000 UT) CAPS          Problem List:  Patient Active Problem List    Diagnosis Date Noted     Dyspnea and respiratory abnormality 06/18/2015     Priority: Medium     Problem list name updated by automated process. 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  No past surgical history on file.      Physical Examination:  Vitals: Ht 1.676 m (5' 6\")   Wt 76.2 kg (168 lb)   BMI 27.12 kg/m    BMI= Body mass index is 27.12 kg/m .     [place] Total Score 7/22/2020   Total score - [place] 3     ELZBIETA scale 20/28    Neck Circumference: - inches/cm   Constitutional: . Awake, alert, cooperative, dressed casually, good eye contact, comfortably sitting in a chair, in no apparent distress  Mood: euthymic; affect congruent with full range and intensity.  Attention/Concentration:  Normal   Eyes: No icterus.  ENT: Mallampati Class: IV.   Tonsillar Stage: 1  hidden by pillars   crowded oropharynx,  low-lying soft palate  deviated nasal septum,  Dentition: good condition, one " molar implant, good advancement of lower jaw without tmd  Pulmonary: Normal respiration rate at rest   Extremities: Upper extremities without clubbing bilaterally .  Skin: Facial: No rash or significant lesions.   Neurologic: Alert, oriented x3, no focal neurological deficit,                   A      Sincerely,        Shelli KAITLIN Grajeda CNP

## 2020-07-23 NOTE — LETTER
7/23/2020        RE: Anuj Tam  [address]  [facility] [address]        Referral Information     Referral #  Creation Date  Referral Status  Status Update     [id number]  07/15/2020  Authorized  07/15/2020: Status History    Status Reason  Referral Type  Referral Reasons  Referral Class    No Approval Necessary - Patient Tracking  none  none  none    To Specialty  To Provider  To Location/Place of Service  To Department    Nurse Practitioner  Shelli Kraus APRN CNP  [facility]    To Vendor  Referred By  By Location/Place of Service  By Department    none  none  none  none    Priority  Start Date  Expiration Date  Referral Entered By    Routine  07/23/2020 07/23/2021  Janneth Chavez    Visits Requested  Visits Authorized  Visits Completed  Visits Scheduled    1  1   1    Procedure Information     Free Text Procedure Description     NEW REFERRED OUTSIDE [facility]             Diagnosis Information     Diagnosis Description    ref by Dr. Velazquez from [facility]-- advised pt of vitrual visit - send sleep packet    Referral Notes   Number of Notes: 1   Type  Date  User  Summary  Attachment    FSC  07/15/2020  8:55 AM  Janneht Chavez  NEW REFERRED OUTSIDE [facility]  -    Note     PB DOS:                                 7/23/2020  Payor:                                     [employer] [place]     FSC Clearance Note  Visit Description:                  NEW REFERRED OUTSIDE [facility]     Benefits Contact:                  JOAQUIN-Verified  Benefits Phone:                      N/A  Reference Number:                N/A     Assigned Clinic:                      No assigned [facility]  Referral Number:                    N/A  Referral Date Range:              N/A  Number of Visits:                    N/A     Procedures  Actigraphy:                              Non-Covered     Oximetry:                                 Non-Covered     HST/PSG:                                Auth Required  MSLT/MWT:                            Auth Required   WatchPat:                               Auth Required                     Sleep Center   Outpatient Sleep Medicine Consultation  7/23/2020        Name: Anuj Tam MRN# [id number]   Age:  34 YOB: 1985      Date of Consultation: 7/7/2020  Consultation is requested by: KAITLIN Lord         Reason for Sleep Consult:      Snoring, annoying bed partner, less restorative sleep when not in COVID times with earlier deadlines and more anxiety with sleep          Assessment and Plan:    Christy reports that this year he has had a history of difficulty with worsening of snoring, and less restorative sleep prior to COVID with some worry and anxiety about deadlines with arousals from sleep.  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  And the following diagnosis and plan has been developed...    Summary Sleep Diagnoses:      Snoring : Loud, gasping arousals    Insomnia : Possible insomnia with worry occurring during times of sleep fragmentation due to snoring and apnea.  Impact of circadian rhythm as well is likely playing a part as well as sleep habits of using devices (has changed the light exposure on 1 of his devices) and doing his study work within the 1 hour prior to bedtime  Sleep  habits: Alcohol prior to bedtime is likely adding to sleep fragmentation, worry and to do list behaviors are present with wake ups.  Wake ups fortunately are brief  Summary Recommendations:       HST with stop bang score of 4/8    Treatment therapies and final decision through my chart communication with virtual visit after results of testing.     Summary Counseling:  See instructions     Counseling included a comprehensive review of pathophysiology of possible obstructive sleep apnea, and delayed sleep phase with variance and rise time limiting total sleep time, impact of alcohol and less downtime prior to lights out (and possible device light) likely impacting quality of nights rest.  Diagnostic and therapeutic strategies as well as risks of inadequate therapy or no treatment were discussed.      Cessation of sleep habits alcohol before bedtime, wind down time, use of bed for his PhD work, and possible light from some devices were brought up.  He does have the light changed on 1 of his devices to a black background   Anuj was provided with educational materials in instructions which were reviewed with this visit.                History of Present Illness:    referred with c/o's of loud snoring and gasping arousals several times/night    Phyllis reports loud snoring, gasping or snorting arousals (several times per night) and on occasion needing to sleep separately from bed partner    (snoing, , gasping-sort or snort, waking up with panic or inc in HR, sleeping separately, )      SLEEP COMPLAINTS: DENIES THE FOLLOWING: (unless indicated in RED):        Signs/symptoms:    Morning headaches or confusion--  GERD or aspiration:+  Pain -  Bruxism: +  Parasomnia:  sw  -: confusional arousals  - no act  Narcolepsy:  Cataplexy  -, sleep paralysis - , hallucinations  -  RLS: : -      SLEEP-WAKE SCHEDULE:   Enters bed on [id number], exits bed on weekdays 8-8:30; enters bed at weekends 12-1:30 2A, exits bed on weekends:  9:30-10A  15-30-40 minsSLEEP LATENCY   And 0- one awakening for bathroom with < 5mins to return to sleep  Naps-0-  Sleep schedule is irregular   Work schedule is later in day, anxious   Estimated total sleep time 7-8A  Activities in bed:  Tablet, or computer, phone  Needs 8 hrs, tired     Denies the following unless in RED:   Behaviors in bed:  clock watching - to do list +, worry about health or ability to complete next day's-weeks tasks +  Housework - employment -anxiety or rumination,+ before sleep, unable to estimate number of days/7waking up with heart pounding or racing-               Cardio-respiratory    Coexisting Lung disease:  -,  infrequent       Coexisting Heart disease:  -         Social, personal, family History and use of sleep aids or substances that affect sleep:     yovanny lives with wife. He is a student phd study. Sleep is disrupted by environmental factors from noise,-pets,-, children-, bedpartner+  Has bed partner been sleeping separately because of snoring or:  , Yes, and also does ask him to change position with his snoring    Sleep Family Hx:         Snoring + mom        RLS- -   CAMILO - +  Insomnia -   Parasomnia -   Chemical History:      Tobacco: cigar     Uses 1 cups/day of coffee occasional in afternoon. Last caffeine intake is usually before  7pm    Supplements for wakefulness: -    EtOH:  Some Weeks 1 or 2 after 6 pm, could be later on weekend: 12:30 AM Wine    Recreational Drugs: - mj   Sleep aids: yes: took valarian root, melatonin  (drowsy in am)          Review of Systems:     A complete 10 point review of systems was negative other than HPI or as commented below:   Harinder has gained 5-10 pounds 3 years.      CONSTITUTIONAL: NEGATIVE for weight gain/loss, fever, chills, sweats or night sweats, drug allergies.  EYES: NEGATIVE for changes in vision, blind spots, double vision.  ENT: NEGATIVE for ear pain, sore throat, sinus pain, post-nasal drip, runny nose, bloody nose  CARDIAC:  "NEGATIVE for fast heartbeats or fluttering in chest, chest pain or pressure, breathlessness when lying flat, swollen legs or swollen feet-with walking alot  NEUROLOGIC: NEGATIVE headaches, weakness or numbness in the arms or legs.  DERMATOLOGIC: NEGATIVE for rashes, new moles or change in mole(s)  PULMONARY: NEGATIVE SOB at rest, SOB with activity ( 2fights w/o stopping), dry cough, productive cough, coughing up blood, wheezing or whistling when breathing.    GASTROINTESTINAL: NEGATIVE for nausea or vomitting, loose or watery stools, fat or grease in stools, constipation, abdominal pain, bowel movements black in color or blood noted.  GENITOURINARY: NEGATIVE for pain during urination, blood in urine, urinating more frequently than usual, irregular menstrual periods.  MUSCULOSKELETAL: NEGATIVE for muscle pain, bone or joint pain, swollen joints.  ENDOCRINE: NEGATIVE for increased thirst or urination, diabetes.  MH: depression/anxiety started sertrolyine            Physical Examination:     Allergies:    No Known Allergies    Medications:    Current Outpatient Medications   Medication Sig Dispense Refill     B Complex Vitamins (VITAMIN B COMPLEX PO)        finasteride (PROPECIA) 1 MG tablet Take 1 mg by mouth daily       sertraline (ZOLOFT) 100 MG tablet Take 100 mg by mouth daily       Vitamin D, Cholecalciferol, 25 MCG (1000 UT) CAPS          Problem List:  Patient Active Problem List    Diagnosis Date Noted     Dyspnea and respiratory abnormality 06/18/2015     Priority: Medium     Problem list name updated by automated process. 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  No past surgical history on file.      Physical Examination:  Vitals: Ht 1.676 m (5' 6\")   Wt 76.2 kg (168 lb)   BMI 27.12 kg/m    BMI= Body mass index is 27.12 kg/m .     [place] Total Score 7/22/2020   Total score - [place] 3 " "    ELZBIETA scale 20/28    Neck Circumference: - inches/cm   Constitutional: . Awake, alert, cooperative, dressed casually, good eye contact, comfortably sitting in a chair, in no apparent distress  Mood: euthymic; affect congruent with full range and intensity.  Attention/Concentration:  Normal   Eyes: No icterus.  ENT: Mallampati Class: IV.   Tonsillar Stage: 1  hidden by pillars   crowded oropharynx,  low-lying soft palate  deviated nasal septum,  Dentition: good condition, one molar implant, good advancement of lower jaw without tmd  Pulmonary: Normal respiration rate at rest   Extremities: Upper extremities without clubbing bilaterally .  Skin: Facial: No rash or significant lesions.   Neurologic: Alert, oriented x3, no focal neurological deficit,                   A    Anuj Tam is a 34 year old male who is being evaluated via a billable video visit.      The patient has been notified of following:     \"This video visit will be conducted via a call between you and your physician/provider. We have found that certain health care needs can be provided without the need for an in-person physical exam.  This service lets us provide the care you need with a video conversation.  If a prescription is necessary we can send it directly to your pharmacy.  If lab work is needed we can place an order for that and you can then stop by our lab to have the test done at a later time.    Video visits are billed at different rates depending on your insurance coverage.  Please reach out to your insurance provider with any questions.    If during the course of the call the physician/provider feels a video visit is not appropriate, you will not be charged for this service.\"    Patient has given verbal consent for Video visit? Yes  How would you like to obtain your AVS? MyChart  If you are dropped from the video visit, the video invite should be resent to: Send to e-mail at: [email or web address]  Will anyone else be joining your video " "visit? No  {If patient encounters technical issues they should call [phone] :[id number]}      Video-Visit Details    Type of service:  Video Visit    Video Start Time: {video visit start/end time:152948}  Video End Time: {video visit start/end time:152948}    Originating Location (pt. Location): {video visit patient location:253374::\"Home\"}    Distant Location (provider location):  [facility]     Platform used for Video Visit: {Virtual Visit Platforms:674522::\"[employer]\"}    {signature options:493707}            Sincerely,        Shelli KAITLIN Grajeda CNP    "

## 2020-08-17 ENCOUNTER — MYC MEDICAL ADVICE (OUTPATIENT)
Dept: SLEEP MEDICINE | Facility: CLINIC | Age: 35
End: 2020-08-17

## 2020-08-19 ENCOUNTER — OFFICE VISIT (OUTPATIENT)
Dept: SLEEP MEDICINE | Facility: CLINIC | Age: 35
End: 2020-08-19
Payer: COMMERCIAL

## 2020-08-19 ENCOUNTER — DOCUMENTATION ONLY (OUTPATIENT)
Dept: SLEEP MEDICINE | Facility: CLINIC | Age: 35
End: 2020-08-19

## 2020-08-19 DIAGNOSIS — R06.83 SNORING: ICD-10-CM

## 2020-08-19 PROCEDURE — G0399 HOME SLEEP TEST/TYPE 3 PORTA: HCPCS | Performed by: INTERNAL MEDICINE

## 2020-08-20 ENCOUNTER — DOCUMENTATION ONLY (OUTPATIENT)
Dept: SLEEP MEDICINE | Facility: CLINIC | Age: 35
End: 2020-08-20

## 2020-08-20 NOTE — PROGRESS NOTES
HST POST-STUDY QUESTIONNAIRE    1. What time did you go to bed?  11:30 pm  2. How long do you think it took to fall asleep?  30 - 40 minutes  3. What time did you wake up to start the day?  7:30 am  4. Did you get up during the night at all?  yes  5. If you woke up, do you remember approximately what time(s)? 3 am and 6 am  6. Did you have any difficulty with the equipment?  Yes It was  uncomfortable to sleep  7. Did you us any type of treatment with this study?  None  8. Was the head of the bed elevated? Yes  9. Did you sleep in a recliner?  No  10. Did you stop using CPAP at least 3 days before this test?  NA  11. Any other information you'd like us to know? n/a

## 2020-08-21 NOTE — PROGRESS NOTES
This HSAT was performed using a Noxturnal T3 device which recorded snore, sound, movement activity, body position, nasal pressure, oronasal thermal airflow, pulse, oximetry and both chest and abdominal respiratory effort. HSAT data was restricted to the time patient states they were in bed.     HSAT was scored using 1B 4% hypopnea rule.     HST AHI (Non-PAT): 14.5  Snoring was reported as loud.  Time with SpO2 below 89% was 3.6 minutes.   Overall signal quality was good.     Pt will follow up with sleep provider to determine appropriate therapy.

## 2020-09-03 ENCOUNTER — VIRTUAL VISIT (OUTPATIENT)
Dept: SLEEP MEDICINE | Facility: CLINIC | Age: 35
End: 2020-09-03
Payer: COMMERCIAL

## 2020-09-03 VITALS — WEIGHT: 183 LBS | BODY MASS INDEX: 29.54 KG/M2

## 2020-09-03 DIAGNOSIS — G47.33 OSA (OBSTRUCTIVE SLEEP APNEA): Primary | ICD-10-CM

## 2020-09-03 PROCEDURE — 99214 OFFICE O/P EST MOD 30 MIN: CPT | Mod: 95 | Performed by: NURSE PRACTITIONER

## 2020-09-03 NOTE — PROCEDURES
"HOME SLEEP STUDY INTERPRETATION    Patient: Anuj Tam  MRN: [id number]  YOB: 1985  Study Date: 2020  Referring Provider: [facility]  Ordering Provider: Shelli Kraus NP   Indications for Home Study: Anuj Tam is a 34 year old male with  symptoms suggestive of obstructive sleep apnea.    Estimated body mass index is 27.12 kg/m  as calculated from the following:    Height as of 20: 1.676 m (5' 6\").    Weight as of 20: 76.2 kg (168 lb).  Irwin Sleepiness Scale: 3/24  STOP-BAN/8    Data: A full night home sleep study was performed recording the standard physiologic parameters including body position, movement, sound, nasal pressure, thermal oral airflow, chest and abdominal movements with respiratory inductance plethysmography, and oxygen saturation by pulse oximetry. Pulse rate was estimated by oximetry recording. This study was considered adequate based on > 4 hours of quality oximetry and respiratory recording. As specified by the AASM Manual for the Scoring of Sleep and Associated events, version 2.3, Rule VIII.D 1B, 4% oxygen desaturation scoring for hypopneas is used as a standard of care on all home sleep apnea testing.    Analysis Time:  517.9 minutes    Respiration:   Sleep Associated Hypoxemia: sustained hypoxemia was not present. Baseline oxygen saturation was 94.3%.  Time with saturation less than or equal to 88% was 3.6 minutes. The lowest oxygen saturation was 84%.   Snoring: Snoring was present.  Respiratory events: The home study revealed a presence of 30 obstructive apneas and 5 mixed and central apneas. There were 90 hypopneas resulting in a combined apnea/hypopnea index [AHI] of 14.5 events per hour.  AHI was 15.4 per hour supine, 0 per hour prone, 14.1 per hour on left side, and 14.1 per hour on right side.   Pattern: Excluding events noted above, respiratory rate and pattern was Normal.    Position: Percent of time spent: supine - 55.8%, prone - " 2.0%, on left - 21.4%, on right - 20.6%.    Heart Rate: By pulse oximetry, the average reate was normal at 70 bpm. Minimum heart rate was 52 bpm and maximum rate was 106 bpm.    Assessment:   Mild obstructive sleep apnea.  Sleep associated hypoxemia was not present.    Recommendations:  Consider auto-CPAP at 5-15 cmH2O.  Suggest optimizing sleep hygiene and avoiding sleep deprivation.  Weight management.    Diagnosis Code(s): Obstructive Sleep Apnea G47.33, Hypoxemia G47.36, Snoring R06.83    Yao Palacios MD, September 3, 2020   Diplomate, American Board of Internal Medicine, Sleep Medicine'

## 2020-09-03 NOTE — PROGRESS NOTES
"Anuj Tam is a 34 year old male who is being evaluated via a billable video visit.      The patient has been notified of following:     \"This video visit will be conducted via a call between you and your physician/provider. We have found that certain health care needs can be provided without the need for an in-person physical exam.  This service lets us provide the care you need with a video conversation.  If a prescription is necessary we can send it directly to your pharmacy.  If lab work is needed we can place an order for that and you can then stop by our lab to have the test done at a later time.    Video visits are billed at different rates depending on your insurance coverage.  Please reach out to your insurance provider with any questions.    If during the course of the call the physician/provider feels a video visit is not appropriate, you will not be charged for this service.\"    Patient has given verbal consent for Video visit? Yes  How would you like to obtain your AVS? MyChart  If you are dropped from the video visit, the video invite should be resent to: Send to e-mail at: [email or web address]  Will anyone else be joining your video visit? No      Video-Visit Details    Type of service:  Video Visit    Video Start Time: 3pm  Video End Time: 3:40pm    Originating Location (pt. Location): Home    Distant Location (provider location):  [facility]     Platform used for Video Visit: KAITLNI Britt CNP     CC: Return today to discuss results of home sleep study    History of present illness:Christy reports that this year he has had a history of difficulty with worsening of snoring, and less restorative sleep than prior to COVID with some worry and anxiety about deadlines, and arousals from sleep.  wakes up gasping, snorting. 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     SCALES       SLEEP APNEA: Stop bang score  4/8       INSOMNIA:  Insomnia severity score:         SLEEPINESS: [place] sleepiness scale:  3/24    HOME SLEEP STUDY INTERPRETATION     Patient: Anuj Tam  MRN: [id number]  YOB: 1985  Study Date: 2020  Referring Provider: [facility]  Ordering Provider: Shelli Kraus NP   Indications for Home Study: Anuj Tam is a 34 year old male with  symptoms suggestive of obstructive sleep apnea.     Estimated body mass index is 27.12 kg/m  as calculated from the following:    Height as of 20: 1.676 m (5' 6\").    Weight as of 20: 76.2 kg (168 lb).  [place] Sleepiness Scale: 3/24  STOP-BAN8     Data: A full night home sleep study was performed recording the standard physiologic parameters including body position, movement, sound, nasal pressure, thermal oral airflow, chest and abdominal movements with respiratory inductance plethysmography, and oxygen saturation by pulse oximetry. Pulse rate was estimated by oximetry recording. This study was considered adequate based on > 4 hours of quality oximetry and respiratory recording. As specified by the AASM Manual for the Scoring of Sleep and Associated events, version 2.3, Rule VIII.D 1B, 4% oxygen desaturation scoring for hypopneas is used as a standard of care on all home sleep apnea testing.     Analysis Time:  517.9 minutes     Respiration:   Sleep Associated Hypoxemia: sustained hypoxemia was not present. Baseline oxygen saturation was 94.3%.  Time with saturation less than or equal to 88% was 3.6 minutes. The lowest oxygen saturation was 84%.   Snoring: Snoring was present.  Respiratory events: The home study revealed a presence of 30 obstructive apneas and 5 mixed and central apneas. There were 90 hypopneas resulting in a combined " apnea/hypopnea index [AHI] of 14.5 events per hour.  AHI was 15.4 per hour supine, 0 per hour prone, 14.1 per hour on left side, and 14.1 per hour on right side.   Pattern: Excluding events noted above, respiratory rate and pattern was Normal.     Position: Percent of time spent: supine - 55.8%, prone - 2.0%, on left - 21.4%, on right - 20.6%.     Heart Rate: By pulse oximetry, the average reate was normal at 70 bpm. Minimum heart rate was 52 bpm and maximum rate was 106 bpm.     Assessment:   Mild obstructive sleep apnea.  Sleep associated hypoxemia was not present.     Recommendations:  Consider auto-CPAP at 5-15 cmH2O.  Suggest optimizing sleep hygiene and avoiding sleep deprivation.  Weight management.     Diagnosis Code(s): Obstructive Sleep Apnea G47.33, Hypoxemia G47.36, Snoring R06.83     Yao Palacios MD, September 3, 2020   Diplomate, American Board of Internal Medicine, Sleep Medicine'      HST-Home Sleep Apnea Test [[id number]]     Anuj states that he did not sleep very well at night.  He feels he was awake for almost an hour initially and then had a wake up in the middle of the night as well which was shorter.  We discussed treatment options, we also discussed the degrees of severity of sleep apnea.  He indicates that he is exercising more and knows that he will lose weight.  I indicated this would help.  We discussed nontraditional treatment options such as positional device as he had more apnea on his back than he did on either side.  He is considering a positional device, and looking into the cost of CPAP and a dental appliance.  I have discussed a manner for him to evaluate whether snoring is reduced by using snore lab mir.  And the following plan of care has been developed    Allergies:    No Known Allergies    Medications:    Current Outpatient Medications   Medication Sig Dispense Refill     B Complex Vitamins (VITAMIN B COMPLEX PO)        sertraline (ZOLOFT) 100 MG  tablet Take 25 mg by mouth daily        Vitamin D, Cholecalciferol, 25 MCG (1000 UT) CAPS        finasteride (PROPECIA) 1 MG tablet Take 1 mg by mouth daily         Problem List:  Patient Active Problem List    Diagnosis Date Noted     Dyspnea and respiratory abnormality 06/18/2015     Priority: Medium     Problem list name updated by automated process. 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  No past surgical history on file.        Physical Examination:  Vitals: Wt 83 kg (183 lb)   BMI 29.54 kg/m    BMI= Body mass index is 29.54 kg/m .       Vital Signs 12/24/2019   Systolic 115   Diastolic 71   Pulse 84   Temperature 98.1   Respirations 16       Watson Total Score 9/3/2020   Total score - Watson 2       GENERAL APPEARANCE: healthy, alert and no distress  EYES: Eyes grossly normal to inspection  RESP: Respiratory rate is regular at rest  PSYCH: mentation appears normal and affect normal/bright    Assessment plan:  1.  Mild obstructive sleep apnea 50% of the events occurred supine and the rest occurred equally on either side.  He may benefit from a positional device, a dental appliance, or could consider CPAP.  Resources were applied.  Recommend he have a virtual visit to discuss his response to a positional device and if he wishes to be restudied.  2.  BMI 29: Has taken up returning to the gym.  Recommended weight loss as a measure to reduce apnea and snoring.

## 2020-09-03 NOTE — PATIENT INSTRUCTIONS
"      Positional trial  Positioning Device  Positioning devices are generally used when sleep apnea is mild and only occurs on your back.The example I showed today was a pillow liked stuffed \"pockets\"  that straps around the waist. It may be appropriate for those whose sleep study shows milder sleep apnea that occurs primarily when lying flat on one's back. Preliminary studies have shown benefit but effectiveness at home may need to be verified by a home sleep test. These devices are generally not covered by medical insurance.                  Trial a backpack with a  chest strap stuffed with a pillow;  after a couple of weeks of rolling from one shoulder to another over the belly is going well, consider dscovered or iFollo for slumber bump pillow about 60-$70    Use snore lab to gauge your progress over time.  By the way, the person who scored your study said snoring was \"loud\".    (in mean time: call insurance to get an estimate of the cost of):  cpap and the supplies, and a dental appliance for sleep apnea.  Ask questions, what would I need to pay out of pocket.      Follow up in 5-6 wks with a provider for a determination if you want to be restudied with a positional device on.  We have a less cumbersome device now, no straps, etc... you can call the office at [phone] or [phone] and ask to have a follow up visit and talk about next steps    You could consider cpap.  I reviewed the study again after we spoke, and the dip in low oxygen levels does not look real, looks like a monitor may have slipped    Or you could consider a dental appliance.      Weight loss should help with snoring too.  Also avoiding alcohol within 3-4 hrs, and getting enough rest,    Here is the whole handout:  MY TREATMENT INFORMATION FOR SLEEP APNEA-  Anuj Tam    Provider: [facility]  SLEEP CENTER :  [place]    MY CONTACT NUMBER: [phone]    Frequently asked questions:  1. What is Obstructive Sleep Apnea " "(CAMILO)? CAMILO is the most common type of sleep apnea. Apnea means, \"without breath.\"  Apnea is most often caused by narrowing or collapse of the upper airway as muscles relax during sleep.   Almost everyone has occasional apneas. Most people with sleep apnea have had brief interruptions at night frequently for many years.  The severity of sleep apnea is related to how frequent and severe the events are.   2. What are the consequences of CAMILO? Symptoms include: feeling sleepy during the day, snoring loudly, gasping or stopping of breathing, trouble sleeping, and occasionally morning headaches or heartburn at night.  Sleepiness can be serious and even increase the risk of falling asleep while driving. Other health consequences may include development of high blood pressure and other cardiovascular disease in persons who are susceptible. Untreated CAMILO  can contribute to heart disease, stroke and diabetes.   3. What are the treatment options? In most situations, sleep apnea is a lifelong disease that must be managed with daily therapy. Medications are not effective for sleep apnea and surgery is generally not considered until other therapies have been tried. Your treatment is your choice . Continuous Positive Airway (CPAP) works right away and is the therapy that is effective in nearly everyone. An oral device to hold your jaw forward is usually the next most reliable option. Other options include postioning devices (to keep you off your back), weight loss, and surgery including a tongue pacing device. There is more detail about some of these options below.    Important tips for using CPAP and similar devices   Know your equipment:  CPAP is continuous positive airway pressure that prevents obstructive sleep apnea by keeping the throat from collapsing while you are sleeping. In most cases, the device is  smart  and can slowly self-adjusts if your throat collapses and keeps a record every day of how well you are treated-this " information is available to you and your care team.  BPAP is bilevel positive airway pressure that keeps your throat open and also assists each breath with a pressure boost to maintain adequate breathing.  Special kinds of BPAP are used in patients who have inadequate breathing from lung or heart disease. In most cases, the device is  smart  and can slowly self-adjusts to assist breathing. Like CPAP, the device keeps a record of how well you are treated.  Your mask is your connection to the device. You get to choose what feels most comfortable and the staff will help to make sure if fits. Here: are some examples of the different masks that are available:       Key points to remember on your journey with sleep apnea:  1. Sleep study.  PAP devices often need to be adjusted during a sleep study to show that they are effective and adjusted right.  2. Good tips to remember: Try wearing just the mask during a quiet time during the day so your body adapts to wearing it. A humidifier is recommended for comfort in most cases to prevent drying of your nose and throat. Allergy medication from your provider may help you if you are having nasal congestion.  3. Getting settled-in. It takes more than one night for most of us to get used to wearing a mask. Try wearing just the mask during a quiet time during the day so your body adapts to wearing it. A humidifier is recommended for comfort in most cases. Our team will work with you carefully on the first day and will be in contact within 4 days and again at 2 and 4 weeks for advice and remote device adjustments. Your therapy is evaluated by the device each day.   4. Use it every night. The more you are able to sleep naturally for 7-8 hours, the more likely you will have good sleep and to prevent health risks or symptoms from sleep apnea. Even if you use it 4 hours it helps. Occasionally all of us are unable to use a medical therapy, in sleep apnea, it is not dangerous to miss one  night.   5. Communicate. Call our skilled team on the number provided on the first day if your visit for problems that make it difficult to wear the device. Over 2 out of 3 patients can learn to wear the device long-term with help from our team. Remember to call our team or your sleep providers if you are unable to wear the device as we may have other solutions for those who cannot adapt to mask CPAP therapy. It is recommended that you sleep your sleep provider within the first 3 months and yearly after that if you are not having problems.   6. Use it for your health. We encourage use of CPAP masks during daytime quiet periods to allow your face and brain to adapt to the sensation of CPAP so that it will be a more natural sensation to awaken to at night or during naps. This can be very useful during the first few weeks or months of adapting to CPAP though it does not help medically to wear CPAP during wakefulness and  should not be used as a strategy just to meet guidelines.  7. Take care of your equipment. Make sure you clean your mask and tubing using directions every day and that your filter and mask are replaced as recommended or if they are not working.     BESIDES CPAP, WHAT OTHER THERAPIES ARE THERE?    Positioning Device  Positioning devices are generally used when sleep apnea is mild and only occurs on your back.This example shows a pillow that straps around the waist. It may be appropriate for those whose sleep study shows milder sleep apnea that occurs primarily when lying flat on one's back. Preliminary studies have shown benefit but effectiveness at home may need to be verified by a home sleep test. These devices are generally not covered by medical insurance.  Examples of devices that maintain sleeping on the back to prevent snoring and mild sleep apnea.    Belt type body positioner  Http://FilmLoop.SecondMic/    Electronic reminder  Http://nightshifttherapy.com/  Http://www.YourTeamOnlinepod.com.au/      Oral  Appliance  What is oral appliance therapy?  An oral appliance device fits on your teeth at night like a retainer used after having braces. The device is made by a specialized dentist and requires several visits over 1-2 months before a manufactured device is made to fit your teeth and is adjusted to prevent your sleep apnea. Once an oral device is working properly, snoring should be improved. A home sleep test may be recommended at that time if to determine whether the sleep apnea is adequately treated.       Some things to remember:  -Oral devices are often, but not always, covered by your medical insurance. Be sure to check with your insurance provider.   -If you are referred for oral therapy, you will be given a list of specialized dentists to consider or you may choose to visit the Web site of the American Academy of Dental Sleep Medicine  -Oral devices are less likely to work if you have severe sleep apnea or are extremely overweight.     More detailed information  An oral appliance is a small acrylic device that fits over the upper and lower teeth  (similar to a retainer or a mouth guard). This device slightly moves jaw forward, which moves the base of the tongue forward, opens the airway, improves breathing for effective treat snoring and obstructive sleep apnea in perhaps 7 out of 10 people .  The best working devices are custom-made by a dental device  after a mold is made of the teeth 1, 2, 3.  When is an oral appliance indicated?  Oral appliance therapy is recommended as a first-line treatment for patients with primary snoring, mild sleep apnea, and for patients with moderate sleep apnea who prefer appliance therapy to use of CPAP4, 5. Severity of sleep apnea is determined by sleep testing and is based on the number of respiratory events per hour of sleep.   How successful is oral appliance therapy?  The success rate of oral appliance therapy in patients with mild sleep apnea is 75-80% while  in patients with moderate sleep apnea it is 50-70%. The chance of success in patients with severe sleep apnea is 40-50%. The research also shows that oral appliances have a beneficial effect on the cardiovascular health of CAMILO patients at the same magnitude as CPAP therapy7.  Oral appliances should be a second-line treatment in cases of severe sleep apnea, but if not completely successful then a combination therapy utilizing CPAP plus oral appliance therapy may be effective. Oral appliances tend to be effective in a broad range of patients although studies show that the patients who have the highest success are females, younger patients, those with milder disease, and less severe obesity. 3, 6.   Finding a dentist that practices dental sleep medicine  Specific training is available through the American Academy of Dental Sleep Medicine for dentists interested in working in the field of sleep. To find a dentist who is educated in the field of sleep and the use of oral appliances, near you, visit the Web site of the American Academy of Dental Sleep Medicine.    References  1. Mino, et al. Objectively measured vs self-reported compliance during oral appliance therapy for sleep-disordered breathing. Chest 2013; 144(5): 2875-5467.  2. Margarita, et al. Objective measurement of compliance during oral appliance therapy for sleep-disordered breathing. Thorax 2013; 68(1): 91-96.  3. Nicko, et al. Mandibular advancement devices in 620 men and women with CAMILO and snoring: tolerability and predictors of treatment success. Chest 2004; 125: 7569-2487.  4. Huynh, et al. Oral appliances for snoring and CAMILO: a review. Sleep 2006; 29: 244-262.  5. Jimmy et al. Oral appliance treatment for CAMILO: an update. J Clin Sleep Med 2014; 10(2): 215-227.  6. Valencia, et al. Predictors of OSAH treatment outcome. J Dent Res 2007; 86: 8724-8869.      Weight Loss:    Weight loss is a long-term strategy that may improve sleep apnea  in some patients.    Weight management is a personal decision and the decision should be based on your interest and the potential benefits.  If you are interested in exploring weight loss strategies, the following discussion covers the impact on weight loss on sleep apnea and the approaches that may be successful.    Being overweight does not necessarily mean you will have health consequences.  Those who have BMI over 35 or over 27 with existing medical conditions carries greater risk.   Weight loss decreases severity of sleep apnea in most people with obesity. For those with mild obesity who have developed snoring with weight gain, even 15-30 pound weight loss can improve and occasionally eliminate sleep apnea.  Structured and life-long dietary and health habits are necessary to lose weight and keep healthier weight levels.     Though there may be significant health benefits from weight loss, long-term weight loss is very difficult to achieve- studies show success with dietary management in less than 10% of people. In addition, substantial weight loss may require years of dietary control and may be difficult if patients have severe obesity. In these cases, surgical management may be considered.  Finally, older individuals who have tolerated obesity without health complications may be less likely to benefit from weight loss strategies.      [unfilled]    Surgery:    Surgery for obstructive sleep apnea is considered generally only when other therapies fail to work. Surgery may be discussed with you if you are having a difficult time tolerating CPAP and or when there is an abnormal structure that requires surgical correction.  Nose and throat surgeries often enlarge the airway to prevent collapse.  Most of these surgeries create pain for 1-2 weeks and up to half of the most common surgeries are not effective throughout life.  You should carefully discuss the benefits and drawbacks to surgery with your sleep provider  and surgeon to determine if it is the best solution for you.   More information  Surgery for CAMILO is directed at areas that are responsible for narrowing or complete obstruction of the airway during sleep.  There are a wide range of procedures available to enlarge and/or stabilize the airway to prevent blockage of breathing in the three major areas where it can occur: the palate, tongue, and nasal regions.  Successful surgical treatment depends on the accurate identification of the factors responsible for obstructive sleep apnea in each person.  A personalized approach is required because there is no single treatment that works well for everyone.  Because of anatomic variation, consultation with an examination by a sleep surgeon is a critical first step in determining what surgical options are best for each patient.  In some cases, examination during sedation may be recommended in order to guide the selection of procedures.  Patients will be counseled about risks and benefits as well as the typical recovery course after surgery. Surgery is typically not a cure for a person s CAMILO.  However, surgery will often significantly improve one s CAMILO severity (termed  success rate ).  Even in the absence of a cure, surgery will decrease the cardiovascular risk associated with OSA7; improve overall quality of life8 (sleepiness, functionality, sleep quality, etc).      Palate Procedures:  Patients with CAMILO often have narrowing of their airway in the region of their tonsils and uvula.  The goals of palate procedures are to widen the airway in this region as well as to help the tissues resist collapse.  Modern palate procedure techniques focus on tissue conservation and soft tissue rearrangement, rather than tissue removal.  Often the uvula is preserved in this procedure. Residual sleep apnea is common in patient after pharyngoplasty with an average reduction in sleep apnea events of 33%2.      Tongue Procedures:  ExamWhile  patients are awake, the muscles that surround the throat are active and keep this region open for breathing. These muscles relax during sleep, allowing the tongue and other structures to collapse and block breathing.  There are several different tongue procedures available.  Selection of a tongue base procedure depends on characteristics seen on physical exam.  Generally, procedures are aimed at removing bulky tissues in this area or preventing the back of the tongue from falling back during sleep.  Success rates for tongue surgery range from 50-62%3.    Hypoglossal Nerve Stimulation:  Hypoglossal nerve stimulation has recently received approval from the [place] Food and Drug Administration for the treatment of obstructive sleep apnea.  This is based on research showing that the system was safe and effective in treating sleep apnea6.  Results showed that the median AHI score decreased 68%, from 29.3 to 9.0. This therapy uses an implant system that senses breathing patterns and delivers mild stimulation to airway muscles, which keeps the airway open during sleep.  The system consists of three fully implanted components: a small generator (similar in size to a pacemaker), a breathing sensor, and a stimulation lead.  Using a small handheld remote, a patient turns the therapy on before bed and off upon awakening.    Candidates for this device must be greater than 22 years of age, have moderate to severe CAMILO (AHI between 20-65), BMI less than 32, have tried CPAP/oral appliance without success, and have appropriate upper airway anatomy (determined by a sleep endoscopy performed by Dr. Owen).    Hypoglossal Nerve Stimulation Pathway:    The sleep surgeon s office will work with the patient through the insurance prior-authorization process (including communications and appeals).    Nasal Procedures:  Nasal obstruction can interfere with nasal breathing during the day and night.  Studies have shown that relief of nasal  obstruction can improve the ability of some patients to tolerate positive airway pressure therapy for obstructive sleep apnea1.  Treatment options include medications such as nasal saline, topical corticosteroid and antihistamine sprays, and oral medications such as antihistamines or decongestants. Non-surgical treatments can include external nasal dilators for selected patients. If these are not successful by themselves, surgery can improve the nasal airway either alone or in combination with these other options.      Combination Procedures:  Combination of surgical procedures and other treatments may be recommended, particularly if patients have more than one area of narrowing or persistent positional disease.  The success rate of combination surgery ranges from 66-80%2,3.    References  1. Sd RAMIREZ. The Role of the Nose in Snoring and Obstructive Sleep Apnoea: An Update.  Eur Arch Otorhinolaryngol. 2011; 268: 1365-73.  2.  Yani SM; Brianne JA; Gato JR; Pallanch JF; Leobardo MB; Cruz SG; Bryan FUCHS. Surgical modifications of the upper airway for obstructive sleep apnea in adults: a systematic review and meta-analysis. SLEEP 2010;33(10):8112-2643. Chloe BOYKIN. Hypopharyngeal surgery in obstructive sleep apnea: an evidence-based medicine review.  Arch Otolaryngol Head Neck Surg. 2006 Feb;132(2):206-13.  3. Lamberto YH1, Haris Y, Gama AL. The efficacy of anatomically based multilevel surgery for obstructive sleep apnea. Otolaryngol Head Neck Surg. 2003 Oct;129(4):327-35.  4. Chloe BOYKIN, Goldberg A. Hypopharyngeal Surgery in Obstructive Sleep Apnea: An Evidence-Based Medicine Review. Arch Otolaryngol Head Neck Surg. 2006 Feb;132(2):206-13.  5. Dian PJ et al. Upper-Airway Stimulation for Obstructive Sleep Apnea.  N Engl J Med. 2014 Jan 9;370(2):139-49.  6. Jitendra Y et al. Increased Incidence of Cardiovascular Disease in Middle-aged Men with Obstructive Sleep Apnea. Am J Respir Crit Care Med; 2002 166:  159-165  7. Juan M CALI et al. Studying Life Effects and Effectiveness of Palatopharyngoplasty (SLEEP) study: Subjective Outcomes of Isolated Uvulopalatopharyngoplasty. Otolaryngol Head Neck Surg. 2011; 144: 623-631.

## 2020-10-22 ENCOUNTER — TELEPHONE (OUTPATIENT)
Dept: SLEEP MEDICINE | Facility: CLINIC | Age: 35
End: 2020-10-22

## 2020-10-22 DIAGNOSIS — G47.33 OSA (OBSTRUCTIVE SLEEP APNEA): Primary | ICD-10-CM

## 2020-10-22 NOTE — TELEPHONE ENCOUNTER
Patient called and would like to proceed with dental device. Please place orders. Once placed will send referral to patient.

## 2020-12-27 ENCOUNTER — HEALTH MAINTENANCE LETTER (OUTPATIENT)
Age: 35
End: 2020-12-27

## 2021-01-11 VITALS — WEIGHT: 184 LBS | HEIGHT: 66 IN | BODY MASS INDEX: 29.57 KG/M2

## 2021-01-11 ASSESSMENT — MIFFLIN-ST. JEOR: SCORE: 1717.37

## 2021-01-11 NOTE — PATIENT INSTRUCTIONS
Your BMI is Body mass index is 29.7 kg/m .  Weight management is a personal decision.  If you are interested in exploring weight loss strategies, the following discussion covers the approaches that may be successful. Body mass index (BMI) is one way to tell whether you are at a healthy weight, overweight, or obese. It measures your weight in relation to your height.  A BMI of 18.5 to 24.9 is in the healthy range. A person with a BMI of 25 to 29.9 is considered overweight, and someone with a BMI of 30 or greater is considered obese. More than two-thirds of American adults are considered overweight or obese.  Being overweight or obese increases the risk for further weight gain. Excess weight may lead to heart disease and diabetes.  Creating and following plans for healthy eating and physical activity may help you improve your health.  Weight control is part of healthy lifestyle and includes exercise, emotional health, and healthy eating habits. Careful eating habits lifelong are the mainstay of weight control. Though there are significant health benefits from weight loss, long-term weight loss with diet alone may be very difficult to achieve- studies show long-term success with dietary management in less than 10% of people. Attaining a healthy weight may be especially difficult to achieve in those with severe obesity. In some cases, medications, devices and surgical management might be considered.  What can you do?  If you are overweight or obese and are interested in methods for weight loss, you should discuss this with your provider.     Consider reducing daily calorie intake by 500 calories.     Keep a food journal.     Avoiding skipping meals, consider cutting portions instead.    Diet combined with exercise helps maintain muscle while optimizing fat loss. Strength training is particularly important for building and maintaining muscle mass. Exercise helps reduce stress, increase energy, and improves fitness.  Increasing exercise without diet control, however, may not burn enough calories to loose weight.       Start walking three days a week 10-20 minutes at a time    Work towards walking thirty minutes five days a week     Eventually, increase the speed of your walking for 1-2 minutes at time    In addition, we recommend that you review healthy lifestyles and methods for weight loss available through the National Institutes of Health patient information sites:  http://win.niddk.nih.gov/publications/index.htm    And look into health and wellness programs that may be available through your health insurance provider, employer, local community center, or [employer].

## 2021-01-11 NOTE — PROGRESS NOTES
Anuj is a 35 year old who is being evaluated via a billable video visit.      How would you like to obtain your AVS? MyChart  If the video visit is dropped, the invitation should be resent by: Text to cell phone: [phone]  Will anyone else be joining your video visit? Marleny Butler JUANLakeshia MAZARIEGOS, [facility], 1/11/2021 3:07 PM    Video-Visit Details    Type of service:  Video Visit  Video Start  Time:1:12 pm   Video End Time:1:38 pm    Originating Location (pt. Location): Home    Distant Location (provider location):  [facility]     Platform used for Video Visit: Tash Palacios MD  [facility]   [address], [address]   [address] [place] [address]. [place] [address]   Appointments: [phone]    Sleep clinic follow up visit note:    Date on this visit: 1/12/2021    Primary Physician: Zeferino Modoy Md     Chief complaint: follow-up of CAMILO and to discuss CPAP use.    HPI: Anuj Tam is a 35 year old male who was diagnosed with mild obstructive sleep apnea during home sleep study obtained on August 8, 2019 2020.  He presents to sleep clinic for follow-up of CAMILO and to discuss CPAP use.  He was provided with dental referral to obtain oral appliance for treatment of sleep apnea by my colleague Dr. Bella.  He obtained OTC oral device and it worked according to girlfriend in terms of reducing the intensity of snoring, but it was uncomfortable and caused soreness in his jaw. He used to take it off during the night due to the discomfort.  His  sleep quality was not good with the oral device and he discontinued the use.  He recently purchased a Respironics CPAP device from his friend and  got new supplies through [employer].  He mentioned that the device can work as regular CPAP or auto CPAP or BiPAP.  He mentioned that pressure setting is at 6 cmH2O but was not able to tell me any more details. He was  initially using the Respironics DreamWear mask which he did not like and has now switched to Resmed airfit P10 nasal pillow mask.  He feels he might need a little smaller size for the nasal pillow mask.   There has not been any reports of snoring with the CPAP.  There are no reports of awakenings due to gasping for air or choking.  He thinks he could use some more pressure sometimes.  He denies fatigue or excessive daytime sleepiness. ESS:5/24. He denies drowsiness while driving.  He wants to know whether  the pressure settings on his device are adequate or not.    Compliance download was unavailable.    Previous sleep study (home sleep study):  Study Date: 8/19/2020  Analysis Time:  517.9 minutes     Respiration:   Sleep Associated Hypoxemia: sustained hypoxemia was not present. Baseline oxygen saturation was 94.3%.  Time with saturation less than or equal to 88% was 3.6 minutes. The lowest oxygen saturation was 84%.   Snoring: Snoring was present.  Respiratory events: The home study revealed a presence of 30 obstructive apneas and 5 mixed and central apneas. There were 90 hypopneas resulting in a combined apnea/hypopnea index [AHI] of 14.5 events per hour.  AHI was 15.4 per hour supine, 0 per hour prone, 14.1 per hour on left side, and 14.1 per hour on right side.   Pattern: Excluding events noted above, respiratory rate and pattern was Normal.     Position: Percent of time spent: supine - 55.8%, prone - 2.0%, on left - 21.4%, on right - 20.6%.     Heart Rate: By pulse oximetry, the average reate was normal at 70 bpm. Minimum heart rate was 52 bpm and maximum rate was 106 bpm.     Assessment:   Mild obstructive sleep apnea.  Sleep associated hypoxemia was not present.      Allergies:    No Known Allergies    Medications:    Current Outpatient Medications   Medication Sig Dispense Refill     B Complex Vitamins (VITAMIN B COMPLEX PO)        sertraline (ZOLOFT) 100 MG tablet Take 25 mg by mouth daily        Vitamin  "D, Cholecalciferol, 25 MCG (1000 UT) CAPS          Problem List:  Patient Active Problem List    Diagnosis Date Noted     Dyspnea and respiratory abnormality 06/18/2015     Priority: Medium     Problem list name updated by automated process. 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  No past surgical history on file.    Social History:  Social History     Socioeconomic History     Marital status: Single     Spouse name: Not on file     Number of children: Not on file     Years of education: Not on file     Highest education level: Not on file   Occupational History     Not on file   Social Needs     Financial resource strain: Not on file     Food insecurity     Worry: Not on file     Inability: Not on file     Transportation needs     Medical: Not on file     Non-medical: Not on file   Tobacco Use     Smoking status: Never Smoker     Smokeless tobacco: Never Used   Substance and Sexual Activity     Alcohol use: Yes     Comment: Socially     Drug use: No     Sexual activity: Not on file   Lifestyle     Physical activity     Days per week: Not on file     Minutes per session: Not on file     Stress: Not on file   Relationships     Social connections     Talks on phone: Not on file     Gets together: Not on file     Attends [employer] service: Not on file     Active member of club or organization: Not on file     Attends meetings of clubs or organizations: Not on file     Relationship status: Not on file     Intimate partner violence     Fear of current or ex partner: Not on file     Emotionally abused: Not on file     Physically abused: Not on file     Forced sexual activity: Not on file   Other Topics Concern     Not on file   Social History Narrative     Not on file       Family History:  No family history on file.      Physical Examination:  Vitals: Ht 1.676 m (5' 6\")   Wt 83.5 kg (184 lb)   BMI " 29.70 kg/m    BMI= Body mass index is 29.7 kg/m .         Eagle Total Score 1/11/2021   Total score - Eagle 5       General: No apparent distress, appropriately groomed  Head: Normocephalic, atraumatic  Chest: No cough, no audible wheezing, able to talk in full sentences  Psych: coherent speech, normal rate and volume, able to articulate logical thoughts, able   to abstract reason, no tangential thoughts, no hallucinations   or delusions  His affect is normal  Neuro:  Mental status: Alert and  Oriented X 3  Speech: normal   Gait: Normal    No focal neurological deficit    Impression/Plan:  Mild Obstructive sleep apnea: Pt reports positive benefits with CPAP use.   I do not have any information regarding the  pressure settings on his device or the compliance download   He was instructed to bring his CPAP device to our [facility] sleep center along with the SD card, so that we can download the compliance data, check the pressure settings on his device and to make any revisions to the pressure settings, for optimizing the management of his sleep apnea.  I'll communicate with our virtual care coordinators about the situation and the possibility of enrolling him in the sleep therapy management program.  He  was instructed to get a smaller size nasal pillow mask since he points at the current size he is a little better for him.  Recommended him to use the device regularly during sleep.    Above ideal body weight: We discussed weight management with healthy diet, and exercise.    Patient was strongly advised to avoid driving, operating any heavy machinery or other hazardous situations while drowsy or sleepy.  Patient was counseled on the importance of driving while alert, to pull over if drowsy, or nap before getting into the vehicle if sleepy.      Plan is to communicate with him via CSRt after  reviewing the compliance download.    The above note was dictated using voice recognition software. Although reviewed after  completion, some word and grammatical error may remain . Please contact the author for any clarifications.    Yao Palacios MD  [facility]   Floor 1, [address]   [address] [place] [address]   Appointments: [phone]

## 2021-01-12 ENCOUNTER — VIRTUAL VISIT (OUTPATIENT)
Dept: SLEEP MEDICINE | Facility: CLINIC | Age: 36
End: 2021-01-12
Payer: COMMERCIAL

## 2021-01-12 DIAGNOSIS — G47.33 OSA ON CPAP: Primary | ICD-10-CM

## 2021-01-12 PROCEDURE — 99213 OFFICE O/P EST LOW 20 MIN: CPT | Mod: 95 | Performed by: INTERNAL MEDICINE

## 2021-01-12 NOTE — Clinical Note
Renzo Mulligan,    Patient  recently purchased a Respironics CPAP device from a friend and got new supplies through [employer].  He mentioned that the device can work as regular CPAP or auto CPAP or BiPAP. He said he likes it when it functions like BiPAP.  He mentioned that pressure setting is at 6 cmH2O but was not able to tell me any more details.   He wants to know whether the pressure settings on his device are adequate or not.  I've instructed him to bring his CPAP device to our [facility] sleep center along with the SD card, so that we can download the compliance data, check the pressure settings on his device and to make any revisions to the pressure settings, for optimizing the management of his sleep apnea.  I'd like to know if we can enroll him in the sleep therapy management program. He says his device has a modem. Wondering if either you or Chip can help with the pressure changes.    Thanks,  Aditi

## 2021-01-13 ENCOUNTER — DOCUMENTATION ONLY (OUTPATIENT)
Dept: SLEEP MEDICINE | Facility: CLINIC | Age: 36
End: 2021-01-13
Payer: COMMERCIAL

## 2021-01-13 DIAGNOSIS — G47.33 OSA (OBSTRUCTIVE SLEEP APNEA): Primary | ICD-10-CM

## 2021-01-13 NOTE — PROGRESS NOTES
STM start per provider.  Pt is using a friends device.     Current settings:  Auto bilevel: MAX IPAP  14 Min EPAP 6.5 PS min 3 ms max 3    Changed pressure support max to 4 cm H20     One night of data shows pressure is effective at controlling AHI to  3.4

## 2021-01-29 ENCOUNTER — DOCUMENTATION ONLY (OUTPATIENT)
Dept: SLEEP MEDICINE | Facility: CLINIC | Age: 36
End: 2021-01-29
Payer: COMMERCIAL

## 2021-01-29 NOTE — PROGRESS NOTES
14 day call patient using his friends machine Auto bilevel: MAX IPAP  14 Min EPAP 6.5 PS min 3 ms max 3 left message to call back.

## 2021-02-17 ENCOUNTER — DOCUMENTATION ONLY (OUTPATIENT)
Dept: SLEEP MEDICINE | Facility: CLINIC | Age: 36
End: 2021-02-17

## 2021-02-17 NOTE — PROGRESS NOTES
30 DAY STM VISIT    Diagnostic AHI:   14.5  HST    Data only recheck     Assessment: Pt meeting objective benchmarks.     Action plan:   Patient has not scheduled a follow up visit with Dr. Palacios   Device type: Bilevel  PAP settings:  EPAP Min 7    IPAP Max 14    PS Min 3    PS Max 4                Total time spent on accessing and interpreting remote patient PAP therapy data  10 minutes    Total time spent counseling, coaching  and reviewing PAP therapy data with patient  0 minutes     [id number] this call  [id number] no  at 3 or 14 day STM

## 2021-04-09 ENCOUNTER — NURSE TRIAGE (OUTPATIENT)
Dept: NURSING | Facility: CLINIC | Age: 36
End: 2021-04-09

## 2021-04-09 NOTE — TELEPHONE ENCOUNTER
"Patient reports his Sertraline Rx is currently ready at the [facility] pharmacy, which closed at 4:30 pm. Patient did not  his Rx in time. Patient wants to know if there is anyone to call at the pharmacy or at the clinic. The pharmacy is closed on the weekend also. Advised patient that I have no way of contacting anyone in the clinic or pharmacy, as it closed at 4:30 pm. I am unable to page any doctors or reorder any medications. Advised he needs to  the Rx on Monday. Patient verbalized understanding and had no further questions.    Vanita Osborne, RN/M [facility]      Additional Information    Negative: Drug overdose and nurse unable to answer question    Negative: Caller requesting information not related to medicine    Negative: Caller requesting a prescription for Strep throat and has a positive culture result    Negative: Rash while taking a medication or within 3 days of stopping it    Negative: Immunization reaction suspected    Negative: [1] Asthma AND [2] having symptoms of asthma (cough, wheezing, etc)    Negative: MORE THAN A DOUBLE DOSE of a prescription or over-the-counter (OTC) drug    Negative: [1] DOUBLE DOSE (an extra dose or lesser amount) of over-the-counter (OTC) drug AND [2] any symptoms (e.g., dizziness, nausea, pain, sleepiness)    Negative: [1] DOUBLE DOSE (an extra dose or lesser amount) of prescription drug AND [2] any symptoms (e.g., dizziness, nausea, pain, sleepiness)    Negative: Took another person's prescription drug    Negative: [1] DOUBLE DOSE (an extra dose or lesser amount) of prescription drug AND [2] NO symptoms (Exception: a double dose of antibiotics)    Negative: Diabetes drug error or overdose (e.g., insulin or extra dose)    Negative: [1] Request for URGENT new prescription or refill of \"essential\" medication (i.e., likelihood of harm to patient if not taken) AND [2] triager unable to fill per unit policy    Negative: [1] Prescription " not at pharmacy AND [2] was prescribed today by PCP    Negative: Pharmacy calling with prescription questions and triager unable to answer question    Negative: Caller has urgent medication question about med that PCP prescribed and triager unable to answer question    Negative: Caller has NON-URGENT medication question about med that PCP prescribed and triager unable to answer question    Negative: Caller requesting a NON-URGENT new prescription or refill and triager unable to refill per unit policy    Negative: Caller has medication question about med not prescribed by PCP and triager unable to answer question (e.g., compatibility with other med, storage)    Negative: [1] DOUBLE DOSE (an extra dose or lesser amount) of over-the-counter (OTC) drug AND [2] NO symptoms    Negative: [1] DOUBLE DOSE (an extra dose or lesser amount) of antibiotic drug AND [2] NO symptoms    Caller has medication question only, adult not sick, and triager answers question    Protocols used: MEDICATION QUESTION CALL-A-

## 2021-04-24 ENCOUNTER — HEALTH MAINTENANCE LETTER (OUTPATIENT)
Age: 36
End: 2021-04-24

## 2021-10-03 ENCOUNTER — HEALTH MAINTENANCE LETTER (OUTPATIENT)
Age: 36
End: 2021-10-03

## 2022-05-15 ENCOUNTER — HEALTH MAINTENANCE LETTER (OUTPATIENT)
Age: 37
End: 2022-05-15

## 2022-09-11 ENCOUNTER — HEALTH MAINTENANCE LETTER (OUTPATIENT)
Age: 37
End: 2022-09-11

## 2023-06-03 ENCOUNTER — HEALTH MAINTENANCE LETTER (OUTPATIENT)
Age: 38
End: 2023-06-03